# Patient Record
Sex: MALE | Race: WHITE | Employment: UNEMPLOYED | URBAN - METROPOLITAN AREA
[De-identification: names, ages, dates, MRNs, and addresses within clinical notes are randomized per-mention and may not be internally consistent; named-entity substitution may affect disease eponyms.]

---

## 2018-01-10 ENCOUNTER — HOSPITAL ENCOUNTER (OUTPATIENT)
Dept: SURGERY | Age: 58
Discharge: HOME | End: 2018-01-10
Payer: COMMERCIAL

## 2018-01-10 VITALS
RESPIRATION RATE: 16 BRPM | HEART RATE: 80 BPM | WEIGHT: 315 LBS | BODY MASS INDEX: 40.43 KG/M2 | TEMPERATURE: 98 F | HEIGHT: 74 IN | OXYGEN SATURATION: 96 %

## 2018-01-10 LAB
ATRIAL RATE: 72 BPM
BACTERIA SPEC CULT: NORMAL
CALCULATED P AXIS, ECG09: 40 DEGREES
CALCULATED R AXIS, ECG10: 19 DEGREES
CALCULATED T AXIS, ECG11: 51 DEGREES
DIAGNOSIS, 93000: NORMAL
P-R INTERVAL, ECG05: 136 MS
Q-T INTERVAL, ECG07: 402 MS
QRS DURATION, ECG06: 98 MS
QTC CALCULATION (BEZET), ECG08: 440 MS
SERVICE CMNT-IMP: NORMAL
VENTRICULAR RATE, ECG03: 72 BPM

## 2018-01-10 PROCEDURE — 93005 ELECTROCARDIOGRAM TRACING: CPT | Performed by: ANESTHESIOLOGY

## 2018-01-10 PROCEDURE — 87641 MR-STAPH DNA AMP PROBE: CPT | Performed by: ORTHOPAEDIC SURGERY

## 2018-01-10 RX ORDER — FLUOXETINE HYDROCHLORIDE 20 MG/1
20 CAPSULE ORAL DAILY
COMMUNITY

## 2018-01-10 RX ORDER — CYANOCOBALAMIN (VITAMIN B-12) 500 MCG
400 TABLET ORAL DAILY
COMMUNITY

## 2018-01-10 RX ORDER — HYDROCHLOROTHIAZIDE 25 MG/1
25 TABLET ORAL DAILY
COMMUNITY

## 2018-01-10 RX ORDER — LISINOPRIL 40 MG/1
40 TABLET ORAL DAILY
COMMUNITY

## 2018-01-10 RX ORDER — DICLOFENAC POTASSIUM 50 MG/1
50 TABLET, FILM COATED ORAL 3 TIMES DAILY
COMMUNITY

## 2018-01-10 RX ORDER — ROSUVASTATIN CALCIUM 10 MG/1
10 TABLET, COATED ORAL DAILY
COMMUNITY

## 2018-01-10 RX ORDER — AMLODIPINE BESYLATE 2.5 MG/1
2.5 TABLET ORAL DAILY
COMMUNITY
End: 2018-01-19 | Stop reason: DRUGHIGH

## 2018-01-10 RX ORDER — TRAMADOL HYDROCHLORIDE 50 MG/1
50 TABLET ORAL
COMMUNITY
End: 2019-12-28

## 2018-01-10 NOTE — PERIOP NOTES
Patient verified name, , and surgery as listed in [facility]. Type 3 surgery,  assessment complete. Labs per surgeon: not yet received--- labs drawn by Dr Raynette Kawasaki today- chart marked for charge nurse to obtain lab results  Labs per anesthesia protocol: Type and screen dos  EKG: today per anesthesia protocol--- approved by Dr Debbie Aquino and instructions given per hospital policy. Patient provided with and instructed on educational handouts including Guide to Surgery, Pain Management, Hand Hygiene, Blood Transfusion Education, and [place] [facility]esthesia Brochure. Patient answered medical/surgical history questions at their best of ability. All prior to admission medications documented in [facility]. Original medication prescription bottle  visualized during patient appointment. Patient instructed to hold all vitamins 7 days prior to surgery and NSAIDS 5 days prior to surgery, patient verbalized understanding. Medications to be held: diclofenac    Patient instructed to continue previous medications as prescribed prior to surgery and to take the following medications the day of surgery according to anesthesia guidelines with a small sip of water: amlodipine, tramadol, prozac and Crestor. Patient teach back successful and patient demonstrates knowledge of instructions.

## 2018-01-14 ENCOUNTER — ANESTHESIA EVENT (OUTPATIENT)
Dept: SURGERY | Age: 58
DRG: 470 | End: 2018-01-14
Payer: COMMERCIAL

## 2018-01-14 PROBLEM — M17.12 PRIMARY OSTEOARTHRITIS OF LEFT KNEE: Status: ACTIVE | Noted: 2018-01-14

## 2018-01-15 ENCOUNTER — ANESTHESIA (OUTPATIENT)
Dept: SURGERY | Age: 58
DRG: 470 | End: 2018-01-15
Payer: COMMERCIAL

## 2018-01-15 ENCOUNTER — HOSPITAL ENCOUNTER (INPATIENT)
Age: 58
LOS: 2 days | Discharge: HOME | DRG: 470 | End: 2018-01-17
Attending: ORTHOPAEDIC SURGERY | Admitting: ORTHOPAEDIC SURGERY
Payer: COMMERCIAL

## 2018-01-15 DIAGNOSIS — M17.12 PRIMARY OSTEOARTHRITIS OF LEFT KNEE: Primary | ICD-10-CM

## 2018-01-15 PROCEDURE — 65270000029 HC RM PRIVATE

## 2018-01-15 PROCEDURE — 74011250636 HC RX REV CODE- 250/636: Performed by: ANESTHESIOLOGY

## 2018-01-15 PROCEDURE — 77030013727 HC IRR FAN PULSVC ZIMM -B: Performed by: ORTHOPAEDIC SURGERY

## 2018-01-15 PROCEDURE — 77030008703 HC TU ET UNCUF COVD -A: Performed by: ANESTHESIOLOGY

## 2018-01-15 PROCEDURE — 77030011640 HC PAD GRND REM COVD -A: Performed by: ORTHOPAEDIC SURGERY

## 2018-01-15 PROCEDURE — 77010033678 HC OXYGEN DAILY

## 2018-01-15 PROCEDURE — 77030008477 HC STYL SATN SLP COVD -A: Performed by: ANESTHESIOLOGY

## 2018-01-15 PROCEDURE — 76210000000 HC OR PH I REC 2 TO 2.5 HR: Performed by: ORTHOPAEDIC SURGERY

## 2018-01-15 PROCEDURE — 74011250636 HC RX REV CODE- 250/636

## 2018-01-15 PROCEDURE — 74011000250 HC RX REV CODE- 250

## 2018-01-15 PROCEDURE — 74011250636 HC RX REV CODE- 250/636: Performed by: ORTHOPAEDIC SURGERY

## 2018-01-15 PROCEDURE — 77030020753 HC CUF TRNQT 1BLA STRY -B: Performed by: ORTHOPAEDIC SURGERY

## 2018-01-15 PROCEDURE — 77030003602 HC NDL NRV BLK BBMI -B: Performed by: ANESTHESIOLOGY

## 2018-01-15 PROCEDURE — 77030008467 HC STPLR SKN COVD -B: Performed by: ORTHOPAEDIC SURGERY

## 2018-01-15 PROCEDURE — 0SRD0J9 REPLACEMENT OF LEFT KNEE JOINT WITH SYNTHETIC SUBSTITUTE, CEMENTED, OPEN APPROACH: ICD-10-PCS | Performed by: ORTHOPAEDIC SURGERY

## 2018-01-15 PROCEDURE — 94760 N-INVAS EAR/PLS OXIMETRY 1: CPT

## 2018-01-15 PROCEDURE — 74011250637 HC RX REV CODE- 250/637: Performed by: ORTHOPAEDIC SURGERY

## 2018-01-15 PROCEDURE — 74011000258 HC RX REV CODE- 258: Performed by: ORTHOPAEDIC SURGERY

## 2018-01-15 PROCEDURE — 74011000250 HC RX REV CODE- 250: Performed by: ORTHOPAEDIC SURGERY

## 2018-01-15 PROCEDURE — 77030031139 HC SUT VCRL2 J&J -A: Performed by: ORTHOPAEDIC SURGERY

## 2018-01-15 PROCEDURE — C1776 JOINT DEVICE (IMPLANTABLE): HCPCS | Performed by: ORTHOPAEDIC SURGERY

## 2018-01-15 PROCEDURE — 77030003666 HC NDL SPINAL BD -A: Performed by: ORTHOPAEDIC SURGERY

## 2018-01-15 PROCEDURE — 77030018836 HC SOL IRR NACL ICUM -A: Performed by: ORTHOPAEDIC SURGERY

## 2018-01-15 PROCEDURE — 76060000035 HC ANESTHESIA 2 TO 2.5 HR: Performed by: ORTHOPAEDIC SURGERY

## 2018-01-15 PROCEDURE — C1713 ANCHOR/SCREW BN/BN,TIS/BN: HCPCS | Performed by: ORTHOPAEDIC SURGERY

## 2018-01-15 PROCEDURE — 76010000171 HC OR TIME 2 TO 2.5 HR INTENSV-TIER 1: Performed by: ORTHOPAEDIC SURGERY

## 2018-01-15 PROCEDURE — 76942 ECHO GUIDE FOR BIOPSY: CPT | Performed by: ORTHOPAEDIC SURGERY

## 2018-01-15 PROCEDURE — 77030033269 HC SLV COMPR SCD KNE2 CARD -B: Performed by: ORTHOPAEDIC SURGERY

## 2018-01-15 PROCEDURE — 77030020782 HC GWN BAIR PAWS FLX 3M -B: Performed by: ANESTHESIOLOGY

## 2018-01-15 PROCEDURE — 77030019908 HC STETH ESOPH SIMS -A: Performed by: ANESTHESIOLOGY

## 2018-01-15 PROCEDURE — 77030006835 HC BLD SAW SAG STRY -B: Performed by: ORTHOPAEDIC SURGERY

## 2018-01-15 PROCEDURE — 76010010054 HC POST OP PAIN BLOCK: Performed by: ORTHOPAEDIC SURGERY

## 2018-01-15 PROCEDURE — 77030020274 HC MISC IMPL ORTHOPEDIC: Performed by: ORTHOPAEDIC SURGERY

## 2018-01-15 PROCEDURE — 74011250637 HC RX REV CODE- 250/637: Performed by: ANESTHESIOLOGY

## 2018-01-15 DEVICE — (D)CEMENT BNE HV R 40GM -- DUPE USE ITEM 353850: Type: IMPLANTABLE DEVICE | Site: KNEE | Status: FUNCTIONAL

## 2018-01-15 DEVICE — GENESIS II NON-POROUS TIBIAL                                    BASEPLATE SIZE 7 LEFT
Type: IMPLANTABLE DEVICE | Site: KNEE | Status: FUNCTIONAL
Brand: GENESIS II

## 2018-01-15 RX ORDER — SODIUM CHLORIDE, SODIUM LACTATE, POTASSIUM CHLORIDE, CALCIUM CHLORIDE 600; 310; 30; 20 MG/100ML; MG/100ML; MG/100ML; MG/100ML
100 INJECTION, SOLUTION INTRAVENOUS CONTINUOUS
Status: DISCONTINUED | OUTPATIENT
Start: 2018-01-15 | End: 2018-01-15 | Stop reason: HOSPADM

## 2018-01-15 RX ORDER — METOPROLOL TARTRATE 25 MG/1
25 TABLET, FILM COATED ORAL DAILY
COMMUNITY

## 2018-01-15 RX ORDER — AMLODIPINE BESYLATE 5 MG/1
2.5 TABLET ORAL DAILY
Status: DISCONTINUED | OUTPATIENT
Start: 2018-01-16 | End: 2018-01-17 | Stop reason: HOSPADM

## 2018-01-15 RX ORDER — ASPIRIN 325 MG
325 TABLET, DELAYED RELEASE (ENTERIC COATED) ORAL EVERY 12 HOURS
Status: DISCONTINUED | OUTPATIENT
Start: 2018-01-15 | End: 2018-01-17 | Stop reason: HOSPADM

## 2018-01-15 RX ORDER — BUPIVACAINE HYDROCHLORIDE 2.5 MG/ML
INJECTION, SOLUTION EPIDURAL; INFILTRATION; INTRACAUDAL AS NEEDED
Status: DISCONTINUED | OUTPATIENT
Start: 2018-01-15 | End: 2018-01-15 | Stop reason: HOSPADM

## 2018-01-15 RX ORDER — FENTANYL CITRATE 50 UG/ML
INJECTION, SOLUTION INTRAMUSCULAR; INTRAVENOUS AS NEEDED
Status: DISCONTINUED | OUTPATIENT
Start: 2018-01-15 | End: 2018-01-15 | Stop reason: HOSPADM

## 2018-01-15 RX ORDER — HYDROCHLOROTHIAZIDE 25 MG/1
25 TABLET ORAL DAILY
Status: DISCONTINUED | OUTPATIENT
Start: 2018-01-16 | End: 2018-01-17 | Stop reason: HOSPADM

## 2018-01-15 RX ORDER — LIDOCAINE HYDROCHLORIDE 20 MG/ML
INJECTION, SOLUTION EPIDURAL; INFILTRATION; INTRACAUDAL; PERINEURAL AS NEEDED
Status: DISCONTINUED | OUTPATIENT
Start: 2018-01-15 | End: 2018-01-15 | Stop reason: HOSPADM

## 2018-01-15 RX ORDER — DIPHENHYDRAMINE HYDROCHLORIDE 50 MG/ML
12.5 INJECTION, SOLUTION INTRAMUSCULAR; INTRAVENOUS ONCE
Status: DISCONTINUED | OUTPATIENT
Start: 2018-01-15 | End: 2018-01-15 | Stop reason: HOSPADM

## 2018-01-15 RX ORDER — DEXAMETHASONE SODIUM PHOSPHATE 4 MG/ML
INJECTION, SOLUTION INTRA-ARTICULAR; INTRALESIONAL; INTRAMUSCULAR; INTRAVENOUS; SOFT TISSUE AS NEEDED
Status: DISCONTINUED | OUTPATIENT
Start: 2018-01-15 | End: 2018-01-15 | Stop reason: HOSPADM

## 2018-01-15 RX ORDER — FLUOXETINE HYDROCHLORIDE 20 MG/1
20 CAPSULE ORAL DAILY
Status: DISCONTINUED | OUTPATIENT
Start: 2018-01-16 | End: 2018-01-17 | Stop reason: HOSPADM

## 2018-01-15 RX ORDER — ENOXAPARIN SODIUM 100 MG/ML
40 INJECTION SUBCUTANEOUS EVERY 24 HOURS
Status: DISCONTINUED | OUTPATIENT
Start: 2018-01-16 | End: 2018-01-17 | Stop reason: HOSPADM

## 2018-01-15 RX ORDER — OXYCODONE AND ACETAMINOPHEN 5; 325 MG/1; MG/1
1 TABLET ORAL AS NEEDED
Status: DISCONTINUED | OUTPATIENT
Start: 2018-01-15 | End: 2018-01-15 | Stop reason: HOSPADM

## 2018-01-15 RX ORDER — METOPROLOL TARTRATE 25 MG/1
25 TABLET, FILM COATED ORAL DAILY
Status: DISCONTINUED | OUTPATIENT
Start: 2018-01-16 | End: 2018-01-17 | Stop reason: HOSPADM

## 2018-01-15 RX ORDER — ONDANSETRON 2 MG/ML
INJECTION INTRAMUSCULAR; INTRAVENOUS AS NEEDED
Status: DISCONTINUED | OUTPATIENT
Start: 2018-01-15 | End: 2018-01-15 | Stop reason: HOSPADM

## 2018-01-15 RX ORDER — SODIUM CHLORIDE 0.9 % (FLUSH) 0.9 %
5-10 SYRINGE (ML) INJECTION AS NEEDED
Status: DISCONTINUED | OUTPATIENT
Start: 2018-01-15 | End: 2018-01-17 | Stop reason: HOSPADM

## 2018-01-15 RX ORDER — SODIUM CHLORIDE 0.9 % (FLUSH) 0.9 %
5-10 SYRINGE (ML) INJECTION EVERY 8 HOURS
Status: DISCONTINUED | OUTPATIENT
Start: 2018-01-15 | End: 2018-01-17 | Stop reason: HOSPADM

## 2018-01-15 RX ORDER — CELECOXIB 200 MG/1
200 CAPSULE ORAL EVERY 12 HOURS
Status: DISCONTINUED | OUTPATIENT
Start: 2018-01-15 | End: 2018-01-17 | Stop reason: HOSPADM

## 2018-01-15 RX ORDER — PROPOFOL 10 MG/ML
INJECTION, EMULSION INTRAVENOUS AS NEEDED
Status: DISCONTINUED | OUTPATIENT
Start: 2018-01-15 | End: 2018-01-15 | Stop reason: HOSPADM

## 2018-01-15 RX ORDER — EPHEDRINE SULFATE 50 MG/ML
INJECTION, SOLUTION INTRAVENOUS AS NEEDED
Status: DISCONTINUED | OUTPATIENT
Start: 2018-01-15 | End: 2018-01-15 | Stop reason: HOSPADM

## 2018-01-15 RX ORDER — BUPIVACAINE HYDROCHLORIDE 5 MG/ML
INJECTION, SOLUTION EPIDURAL; INTRACAUDAL AS NEEDED
Status: DISCONTINUED | OUTPATIENT
Start: 2018-01-15 | End: 2018-01-15 | Stop reason: HOSPADM

## 2018-01-15 RX ORDER — SODIUM CHLORIDE 9 MG/ML
50 INJECTION, SOLUTION INTRAVENOUS CONTINUOUS
Status: DISCONTINUED | OUTPATIENT
Start: 2018-01-15 | End: 2018-01-15 | Stop reason: HOSPADM

## 2018-01-15 RX ORDER — SODIUM CHLORIDE 0.9 % (FLUSH) 0.9 %
5-10 SYRINGE (ML) INJECTION AS NEEDED
Status: DISCONTINUED | OUTPATIENT
Start: 2018-01-15 | End: 2018-01-15 | Stop reason: HOSPADM

## 2018-01-15 RX ORDER — SODIUM CHLORIDE 0.9 % (FLUSH) 0.9 %
5-10 SYRINGE (ML) INJECTION EVERY 8 HOURS
Status: DISCONTINUED | OUTPATIENT
Start: 2018-01-15 | End: 2018-01-15 | Stop reason: HOSPADM

## 2018-01-15 RX ORDER — FAMOTIDINE 20 MG/1
20 TABLET, FILM COATED ORAL ONCE
Status: DISCONTINUED | OUTPATIENT
Start: 2018-01-15 | End: 2018-01-15 | Stop reason: HOSPADM

## 2018-01-15 RX ORDER — FENTANYL CITRATE 50 UG/ML
25 INJECTION, SOLUTION INTRAMUSCULAR; INTRAVENOUS ONCE
Status: COMPLETED | OUTPATIENT
Start: 2018-01-15 | End: 2018-01-15

## 2018-01-15 RX ORDER — NALOXONE HYDROCHLORIDE 0.4 MG/ML
.2-.4 INJECTION, SOLUTION INTRAMUSCULAR; INTRAVENOUS; SUBCUTANEOUS
Status: DISCONTINUED | OUTPATIENT
Start: 2018-01-15 | End: 2018-01-17 | Stop reason: HOSPADM

## 2018-01-15 RX ORDER — AMOXICILLIN 250 MG
2 CAPSULE ORAL DAILY
Status: DISCONTINUED | OUTPATIENT
Start: 2018-01-16 | End: 2018-01-17 | Stop reason: HOSPADM

## 2018-01-15 RX ORDER — ROSUVASTATIN CALCIUM 20 MG/1
10 TABLET, COATED ORAL DAILY
Status: DISCONTINUED | OUTPATIENT
Start: 2018-01-16 | End: 2018-01-17 | Stop reason: HOSPADM

## 2018-01-15 RX ORDER — MIDAZOLAM HYDROCHLORIDE 1 MG/ML
2 INJECTION, SOLUTION INTRAMUSCULAR; INTRAVENOUS
Status: DISCONTINUED | OUTPATIENT
Start: 2018-01-15 | End: 2018-01-15 | Stop reason: HOSPADM

## 2018-01-15 RX ORDER — MIDAZOLAM HYDROCHLORIDE 1 MG/ML
2 INJECTION, SOLUTION INTRAMUSCULAR; INTRAVENOUS ONCE
Status: COMPLETED | OUTPATIENT
Start: 2018-01-15 | End: 2018-01-15

## 2018-01-15 RX ORDER — CEFAZOLIN SODIUM/WATER 2 G/20 ML
2 SYRINGE (ML) INTRAVENOUS ONCE
Status: DISCONTINUED | OUTPATIENT
Start: 2018-01-15 | End: 2018-01-15 | Stop reason: SDUPTHER

## 2018-01-15 RX ORDER — CEFAZOLIN SODIUM/WATER 2 G/20 ML
2 SYRINGE (ML) INTRAVENOUS EVERY 8 HOURS
Status: COMPLETED | OUTPATIENT
Start: 2018-01-15 | End: 2018-01-16

## 2018-01-15 RX ORDER — OXYCODONE HYDROCHLORIDE 5 MG/1
5 TABLET ORAL
Status: DISCONTINUED | OUTPATIENT
Start: 2018-01-15 | End: 2018-01-15 | Stop reason: HOSPADM

## 2018-01-15 RX ORDER — LISINOPRIL 20 MG/1
40 TABLET ORAL DAILY
Status: DISCONTINUED | OUTPATIENT
Start: 2018-01-16 | End: 2018-01-17 | Stop reason: HOSPADM

## 2018-01-15 RX ORDER — SODIUM CHLORIDE 9 MG/ML
100 INJECTION, SOLUTION INTRAVENOUS CONTINUOUS
Status: DISPENSED | OUTPATIENT
Start: 2018-01-15 | End: 2018-01-17

## 2018-01-15 RX ORDER — ROCURONIUM BROMIDE 10 MG/ML
INJECTION, SOLUTION INTRAVENOUS AS NEEDED
Status: DISCONTINUED | OUTPATIENT
Start: 2018-01-15 | End: 2018-01-15 | Stop reason: HOSPADM

## 2018-01-15 RX ORDER — HYDROCODONE BITARTRATE AND ACETAMINOPHEN 7.5; 325 MG/1; MG/1
1 TABLET ORAL
Status: DISCONTINUED | OUTPATIENT
Start: 2018-01-15 | End: 2018-01-17 | Stop reason: HOSPADM

## 2018-01-15 RX ORDER — LIDOCAINE HYDROCHLORIDE 10 MG/ML
0.1 INJECTION INFILTRATION; PERINEURAL AS NEEDED
Status: DISCONTINUED | OUTPATIENT
Start: 2018-01-15 | End: 2018-01-15 | Stop reason: HOSPADM

## 2018-01-15 RX ORDER — HYDROMORPHONE HYDROCHLORIDE 2 MG/ML
1 INJECTION, SOLUTION INTRAMUSCULAR; INTRAVENOUS; SUBCUTANEOUS
Status: DISCONTINUED | OUTPATIENT
Start: 2018-01-15 | End: 2018-01-17 | Stop reason: HOSPADM

## 2018-01-15 RX ORDER — ACETAMINOPHEN 10 MG/ML
1000 INJECTION, SOLUTION INTRAVENOUS ONCE
Status: COMPLETED | OUTPATIENT
Start: 2018-01-15 | End: 2018-01-15

## 2018-01-15 RX ORDER — DIPHENHYDRAMINE HCL 25 MG
25 CAPSULE ORAL
Status: DISCONTINUED | OUTPATIENT
Start: 2018-01-15 | End: 2018-01-17 | Stop reason: HOSPADM

## 2018-01-15 RX ORDER — SUCCINYLCHOLINE CHLORIDE 20 MG/ML
INJECTION INTRAMUSCULAR; INTRAVENOUS AS NEEDED
Status: DISCONTINUED | OUTPATIENT
Start: 2018-01-15 | End: 2018-01-15 | Stop reason: HOSPADM

## 2018-01-15 RX ORDER — HYDROMORPHONE HYDROCHLORIDE 2 MG/ML
0.5 INJECTION, SOLUTION INTRAMUSCULAR; INTRAVENOUS; SUBCUTANEOUS
Status: DISCONTINUED | OUTPATIENT
Start: 2018-01-15 | End: 2018-01-15 | Stop reason: HOSPADM

## 2018-01-15 RX ADMIN — HYDROMORPHONE HYDROCHLORIDE 0.5 MG: 2 INJECTION, SOLUTION INTRAMUSCULAR; INTRAVENOUS; SUBCUTANEOUS at 16:00

## 2018-01-15 RX ADMIN — ONDANSETRON 4 MG: 2 INJECTION INTRAMUSCULAR; INTRAVENOUS at 14:17

## 2018-01-15 RX ADMIN — HYDROMORPHONE HYDROCHLORIDE 1 MG: 2 INJECTION, SOLUTION INTRAMUSCULAR; INTRAVENOUS; SUBCUTANEOUS at 19:13

## 2018-01-15 RX ADMIN — HYDROMORPHONE HYDROCHLORIDE 0.5 MG: 2 INJECTION, SOLUTION INTRAMUSCULAR; INTRAVENOUS; SUBCUTANEOUS at 16:15

## 2018-01-15 RX ADMIN — ASPIRIN 325 MG: 325 TABLET, DELAYED RELEASE ORAL at 20:37

## 2018-01-15 RX ADMIN — SODIUM CHLORIDE, SODIUM LACTATE, POTASSIUM CHLORIDE, AND CALCIUM CHLORIDE: 600; 310; 30; 20 INJECTION, SOLUTION INTRAVENOUS at 14:26

## 2018-01-15 RX ADMIN — Medication 2 G: at 23:39

## 2018-01-15 RX ADMIN — CELECOXIB 200 MG: 200 CAPSULE ORAL at 20:47

## 2018-01-15 RX ADMIN — PROPOFOL 20 MG: 10 INJECTION, EMULSION INTRAVENOUS at 13:11

## 2018-01-15 RX ADMIN — FENTANYL CITRATE 25 MCG: 50 INJECTION INTRAMUSCULAR; INTRAVENOUS at 13:09

## 2018-01-15 RX ADMIN — EPHEDRINE SULFATE 10 MG: 50 INJECTION, SOLUTION INTRAVENOUS at 14:00

## 2018-01-15 RX ADMIN — MIDAZOLAM HYDROCHLORIDE 2 MG: 1 INJECTION, SOLUTION INTRAMUSCULAR; INTRAVENOUS at 13:09

## 2018-01-15 RX ADMIN — FENTANYL CITRATE 100 MCG: 50 INJECTION, SOLUTION INTRAMUSCULAR; INTRAVENOUS at 13:33

## 2018-01-15 RX ADMIN — ACETAMINOPHEN 1000 MG: 10 INJECTION, SOLUTION INTRAVENOUS at 17:00

## 2018-01-15 RX ADMIN — Medication 10 ML: at 20:39

## 2018-01-15 RX ADMIN — ROCURONIUM BROMIDE 10 MG: 10 INJECTION, SOLUTION INTRAVENOUS at 13:36

## 2018-01-15 RX ADMIN — EPHEDRINE SULFATE 10 MG: 50 INJECTION, SOLUTION INTRAVENOUS at 13:54

## 2018-01-15 RX ADMIN — BUPIVACAINE HYDROCHLORIDE 10 ML: 2.5 INJECTION, SOLUTION EPIDURAL; INFILTRATION; INTRACAUDAL at 13:11

## 2018-01-15 RX ADMIN — PROPOFOL 30 MG: 10 INJECTION, EMULSION INTRAVENOUS at 14:04

## 2018-01-15 RX ADMIN — BUPIVACAINE HYDROCHLORIDE 10 ML: 5 INJECTION, SOLUTION EPIDURAL; INTRACAUDAL at 13:11

## 2018-01-15 RX ADMIN — HYDROMORPHONE HYDROCHLORIDE 0.5 MG: 2 INJECTION, SOLUTION INTRAMUSCULAR; INTRAVENOUS; SUBCUTANEOUS at 16:10

## 2018-01-15 RX ADMIN — SODIUM CHLORIDE, SODIUM LACTATE, POTASSIUM CHLORIDE, AND CALCIUM CHLORIDE: 600; 310; 30; 20 INJECTION, SOLUTION INTRAVENOUS at 13:28

## 2018-01-15 RX ADMIN — FENTANYL CITRATE 50 MCG: 50 INJECTION, SOLUTION INTRAMUSCULAR; INTRAVENOUS at 14:58

## 2018-01-15 RX ADMIN — PROPOFOL 240 MG: 10 INJECTION, EMULSION INTRAVENOUS at 13:36

## 2018-01-15 RX ADMIN — OXYCODONE HYDROCHLORIDE 5 MG: 5 TABLET ORAL at 16:22

## 2018-01-15 RX ADMIN — SODIUM CHLORIDE 100 ML/HR: 900 INJECTION, SOLUTION INTRAVENOUS at 20:38

## 2018-01-15 RX ADMIN — SUCCINYLCHOLINE CHLORIDE 140 MG: 20 INJECTION INTRAMUSCULAR; INTRAVENOUS at 13:36

## 2018-01-15 RX ADMIN — FENTANYL CITRATE 50 MCG: 50 INJECTION, SOLUTION INTRAMUSCULAR; INTRAVENOUS at 14:25

## 2018-01-15 RX ADMIN — HYDROCODONE BITARTRATE AND ACETAMINOPHEN 1 TABLET: 7.5; 325 TABLET ORAL at 20:36

## 2018-01-15 RX ADMIN — DEXAMETHASONE SODIUM PHOSPHATE 4 MG: 4 INJECTION, SOLUTION INTRA-ARTICULAR; INTRALESIONAL; INTRAMUSCULAR; INTRAVENOUS; SOFT TISSUE at 14:15

## 2018-01-15 RX ADMIN — LIDOCAINE HYDROCHLORIDE 100 MG: 20 INJECTION, SOLUTION EPIDURAL; INFILTRATION; INTRACAUDAL; PERINEURAL at 13:36

## 2018-01-15 RX ADMIN — LIDOCAINE HYDROCHLORIDE 5 ML: 20 INJECTION, SOLUTION EPIDURAL; INFILTRATION; INTRACAUDAL; PERINEURAL at 13:11

## 2018-01-15 RX ADMIN — Medication 3 G: at 13:43

## 2018-01-15 RX ADMIN — PROPOFOL 30 MG: 10 INJECTION, EMULSION INTRAVENOUS at 13:10

## 2018-01-15 RX ADMIN — HYDROMORPHONE HYDROCHLORIDE 0.5 MG: 2 INJECTION, SOLUTION INTRAMUSCULAR; INTRAVENOUS; SUBCUTANEOUS at 16:05

## 2018-01-15 NOTE — H&P
Mac Martinez  History and physical     Subjective  Problem List.     1. Left knee pain. 2.  Left knee DJD    This patient presents today for evaluation of left pain. The patient comes in today for pre op evaluation, history and physical, and surgical consent signing. The surgical procedure was reviewed in detail with the patient. The risks, including but not limited to anesthesia, infection, deep vein thrombosis, pulmonary embolus, injury to vessels, tendons, and nerves, paralysis, stroke, heart attack, loss of limb, and death were discussed. The patient understands the post operative course and all questions were answered. No guarantees are made and all alternatives are given. The patient wishes to proceed with the surgery. Appropriate literature and relevant material was reviewed with the patient. Surgical procedure: left total knee arthroplasty. Family health history: breast cancer - mother; high cholesterol - mother; arthritis - mother. Major events: Total foot reconstruction (both). Ongoing medical problems: anxiety, high cholesterol, obesity, osteoarthritis, joint pain. Social history: Patient uses EtOH 4 to 6 times per week and denies use of tobacco.     He is a . REVIEW OF SYSTEMS:.     General: Denies weight change, generally healthy,  change in strength or exercise tolerance. Head: Denies headaches,  vertigo,  injury. .     Eyes: Denies changes in vision. Ears:  Denies change in hearing. Nose: Denies epistaxis,  obstruction,  discharge. .     Mouth: Denies dental difficulties. Neck: Denies pain or stiffness. Breast: Denies noted lumps. Chest: Denies cough or SOB. Heart: Denies chest pains,  palpitations. Abdomen: Denies change in appetite  constipation or diarrhea. : Denies urinary urgency,  dysuria, change in nature of urine. .     Musculoskeletal: left knee pain/DJD     Neurologic: Denies weakness, seizures or paralysis. Psychiatric: Denies depressive symptoms, changes in sleep habits,  changes in thought content. .     Cognitive: denies short/long term memory or word recollection problems. Objective  Vital signs: Height 74 inches; Weight 316.6 lbs; /74 mmHg; Temp 97.7 F; Pulse 69 bpm; Oxygen Saturation 99 %. Patient is a 62year old male who appears his given age and is in no apparent distress. Oriented to person, place, and time. Mood and affect are appropriate for age and situation. Assessment of respiratory effort reveals even and nonlabored respirations. GEN: NAD. Lungs clear to auscultation bilaterally. Heart rate regular without murmur heard to auscultation     He exhibits an antalgic, reciprocal gait while using a cane. Left knee x-rays (4 views, standing - CPT X6262767) taken 12/12/17 reveal: submedal subluxation of the femur on the tibia with a fragment noted medial to the medial tibial plateau. Severe bone on bone joint space narrowing in all 3 compartments. Left long leg x-ray (1 view - CPT 45966) taken today reveals : no malignment of the femur or lower leg. Left Knee examination:. Inspection reveals no external signs of acute injury or trauma. No palpable cords. No palpable cords. Palpation reveals diffuse tenderness over the left knee. Knee flexion is within functional limits, with pain. Knee extension to 0 degrees, with pain. The left knee is stable to stressing in all planes. Patella exam: normal tracking. Muscle tone is normal.     Neurologic: Sensation is intact and symmetrical in all dermatomes. Vascular: Peripheral pulses normal 2/2 lower extremities. Coordination is good. Assessment  left knee pain/DJD.  preop       DIAGNOSIS:        Pain in left knee [ICD-10: M25.562], [ICD-9: 719.46], [SNOMED: 663049708232221]        Primary osteoarthritis of left knee [ICD-10: M17.12], [ICD-9: 715.16], [SNOMED: 162764020]        Preop examination [ICD-10: I29.250], [ICD-9: V72.84], [SNOMED: 677227286]  Plan  We discussed the pathophysiology of the diagnosis and options for treatment. We discussed conservative treatment options including activity modification, monitoring of symptoms, focus on NWB exercises, weight reduction with dietary changes and participation in an exercise regimen, and occasional cortisone injections. We also discussed his surgical option (left TKA). We have talked about the complications of surgery, including the possibility of damage to nerves, arteries, vessels and tendons, bleeding, infection, the possibility of sustaining medical problems, even death. We have talked about the possibility that the condition may not improve after surgery  or that it could actually be worse. He seems to understand and accept these possible complications. He wishes to proceed with surgery at this time. Informed surgical consent obtained. Preop labs obtained. He will attend outpatient PT post op. All questions answered at this time. The patient knows to contact the office with any questions or concerns. .     VERIFICATION OF ANCILLARY DOCUMENTATION:  The portions of the chart completed by ancillary personnel were reviewed by the physician. .     RTC post op.        MEDICATIONS:        FLUoxetine HCl 20 MG Oral Tablet Take 1 tablet (20 mg) by mouth daily  (start date: 1/10/2018)                    prescription:   not prescribed during this visit        Norvasc 2.5 MG Oral Tablet Take 1 tablet (2.5 mg) by mouth daily  (start date: 1/10/2018)                    prescription:   not prescribed during this visit        Voltaren 1 % Transdermal Gel use as directed                    prescription:   not prescribed during this visit        Voltaren 50 MG Oral Tablet take 1 tab PO tid   (stop date: 1/10/2018)                    prescription:   discontinued during this visit        HydroCHLOROthiazide 25 MG Oral Tablet Take 1 tablet (25 mg) by mouth daily  (start date: 1/10/2018)                    prescription:   not prescribed during this visit        Lisinopril 40 MG Oral Tablet Take 1 tablet (40 mg) by mouth daily  (start date: 1/10/2018)                    prescription:   not prescribed during this visit        Rosuvastatin Calcium 10 MG Oral Tablet Take 1 tablet (10 mg) by mouth daily at bedtime  (start date: 1/10/2018)                    prescription:   not prescribed during this visit        TraMADol HCl 50 MG Oral Tablet Take 1 tablet (50 mg) by mouth every 6 hours as needed  (start date: 12/28/2017)                    prescription:   not prescribed during this visit

## 2018-01-15 NOTE — ANESTHESIA PREPROCEDURE EVALUATION
Anesthetic History   No history of anesthetic complications            Review of Systems / Medical History  Patient summary reviewed and pertinent labs reviewed    Pulmonary  Within defined limits                 Neuro/Psych   Within defined limits           Cardiovascular    Hypertension: well controlled          CAD  Pertinent negatives: No cardiac stents  Exercise tolerance: >4 METS     GI/Hepatic/Renal  Within defined limits              Endo/Other        Morbid obesity and arthritis     Other Findings              Physical Exam    Airway  Mallampati: II  TM Distance: 4 - 6 cm  Neck ROM: normal range of motion   Mouth opening: Normal     Cardiovascular    Rhythm: regular  Rate: normal         Dental  No notable dental hx       Pulmonary  Breath sounds clear to auscultation               Abdominal  GI exam deferred       Other Findings            Anesthetic Plan    ASA: 3  Patient did not consent to regional anesthesiaAnesthesia type: general      Post-op pain plan if not by surgeon: peripheral nerve block single    Induction: Intravenous  Anesthetic plan and risks discussed with: Patient      Refuses spinal

## 2018-01-15 NOTE — ANESTHESIA PROCEDURE NOTES
Peripheral Block    Start time: 1/15/2018 1:10 PM  End time: 1/15/2018 1:11 PM  Performed by: Foster Nieves  Authorized by: Foster Nieves       Pre-procedure: Indications: at surgeon's request and post-op pain management    Preanesthetic Checklist: patient identified, risks and benefits discussed, site marked, timeout performed, anesthesia consent given and patient being monitored    Timeout Time: 13:10          Block Type:   Block Type:   Adductor canal  Laterality:  Left  Monitoring:  Standard ASA monitoring, continuous pulse ox, frequent vital sign checks, heart rate and oxygen  Injection Technique:  Single shot  Procedures: ultrasound guided and nerve stimulator    Prep: chlorhexidine    Location:  Mid thigh  Needle Type:  Stimuplex  Needle Gauge:  22 G  Needle Localization:  Nerve stimulator and ultrasound guidance  Motor Response: minimal motor response >0.4 mA    Medication Injected:  0.375%  bupivacaine  Volume (mL):  20  Add'l Medication Injected:  2.0%  lidocaine  Volume (mL):  5    Assessment:  Number of attempts:  1  Injection Assessment:  Local visualized surrounding nerve on ultrasound, negative aspiration for CSF, negative aspiration for blood, no paresthesia, no intravascular symptoms, ultrasound image on chart and incremental injection every 5 mL  Patient tolerance:  Patient tolerated the procedure well with no immediate complications

## 2018-01-15 NOTE — IP AVS SNAPSHOT
[facility] 
 
 
 [address] 
[phone] Patient: Modesta Jackson MRN: [id number] YHN:[id number] About your hospitalization You were admitted on:  January 15, 2018 You last received care in the:  [facility] 7 MED SURG You were discharged on:  January 17, 2018 Why you were hospitalized Your primary diagnosis was:  Primary Osteoarthritis Of Left Knee Your diagnoses also included:  Left Knee Djd Follow-up Information Follow up With Details Comments Contact Info Lissett Crow MD   [place] [id number] 
[phone] Parish Willard MD  Please call the office when they open to confirm follow-up appointment [address] 
[phone] Discharge Orders None A check mariela indicates which time of day the medication should be taken. My Medications START taking these medications Instructions Each Dose to Equal  
 Morning Noon Evening Bedtime HYDROcodone-acetaminophen  mg tablet Commonly known as:  Karie Morrissey Your next dose is: Take on as needed schedule Take 1 Tab by mouth every six (6) hours as needed for Pain. Max Daily Amount: 4 Tabs. 1 Tab CONTINUE taking these medications Instructions Each Dose to Equal  
 Morning Noon Evening Bedtime  
 amLODIPine 2.5 mg tablet Commonly known as:  Juanjo Horn Your next dose is:  Tomorrow Morning Take 2.5 mg by mouth daily. Indications: morning 2.5 mg  
    
  
   
   
   
  
 cholecalciferol 400 unit Tab tablet Commonly known as:  VITAMIN D3 Your next dose is:  Tomorrow Morning Take 400 Units by mouth daily. 400 Units  
    
  
   
   
   
  
 diclofenac potassium 50 mg tablet Commonly known as:  CATAFLAM  
Your next dose is:  Resume home schedule Take 50 mg by mouth three (3) times daily. Indications: hold for surgery- instructed 1/10/18  
 50 mg  
    
   
   
   
  
 hydroCHLOROthiazide 25 mg tablet Commonly known as:  HYDRODIURIL Your next dose is:  Tomorrow Morning Take 25 mg by mouth daily. 25 mg  
    
  
   
   
   
  
 lisinopril 40 mg tablet Commonly known as:  Alonso Chino Your next dose is:  Tomorrow Morning Take 40 mg by mouth daily. Indications: morning 40 mg  
    
  
   
   
   
  
 metoprolol tartrate 25 mg tablet Commonly known as:  LOPRESSOR Your next dose is:  Tomorrow Morning Take 25 mg by mouth daily. 25 mg PROzac 20 mg capsule Generic drug:  FLUoxetine Your next dose is:  Tomorrow Morning Take 20 mg by mouth daily. 20 mg  
    
  
   
   
   
  
 rosuvastatin 10 mg tablet Commonly known as:  CRESTOR Your next dose is:  Tomorrow Morning Take 10 mg by mouth daily. Indications: morning 10 mg  
    
  
   
   
   
  
 traMADol 50 mg tablet Commonly known as:  ULTRAM  
Your next dose is: Take on as needed schedule Take 50 mg by mouth every six (6) hours as needed for Pain. 50 mg Where to Get Your Medications Information on where to get these meds will be given to you by the nurse or doctor. ! Ask your nurse or doctor about these medications HYDROcodone-acetaminophen  mg tablet Discharge Instructions DO NOT remove the dressing on your knee until [address] visits MAY shower ACTIVITY as tolerated NO driving until cleared by Dr. Peter Duarte CALL Dr. Peter Duarte if [phone]):  Fever >100.5 Incision becomes red,  swollen or opens up Incision has yellow, thick drainage or an odor Pain is not managed with prescribed medications Excessive nausea and/or vomiting Report to ***  redness ,swelling or pain  in your calf, back of knee, thigh or groin Go to emergency room for sudden chest pain and difficulty breathing Avoid having pets sleep in bed with you until incision is completely healed DISCHARGE SUMMARY from Nurse PATIENT INSTRUCTIONS: 
 
 
F-face looks uneven A-arms unable to move or move unevenly S-speech slurred or non-existent T-time-call 911 as soon as signs and symptoms begin-DO NOT go Back to bed or wait to see if you get better-TIME IS BRAIN. Warning Signs of HEART ATTACK Call 911 if you have these symptoms: 
? Chest discomfort. Most heart attacks involve discomfort in the center of the chest that lasts more than a few minutes, or that goes away and comes back. It can feel like uncomfortable pressure, squeezing, fullness, or pain. ? Discomfort in other areas of the upper body. Symptoms can include pain or discomfort in one or both arms, the back, neck, jaw, or stomach. ? Shortness of breath with or without chest discomfort. ? Other signs may include breaking out in a cold sweat, nausea, or lightheadedness. Don't wait more than five minutes to call [address]! Fast action can save your life. Calling 911 is almost always the fastest way to get lifesaving treatment. Emergency Medical Services staff can begin treatment when they arrive  up to an hour sooner than if someone gets to the hospital by car. The discharge information has been reviewed with the patient. The patient verbalized understanding. Discharge medications reviewed with the patient and appropriate educational materials and side effects teaching were provided. ___________________________________________________________________________________________________________________________________ [employer] Announcement We are excited to announce that we are making your provider's discharge notes available to you in [employer]. You will see these notes when they are completed and signed by the physician that discharged you from your recent hospital stay. If you have any questions or concerns about any information you see in [employer], please call the Health Information Department where you were seen or reach out to your Primary Care Provider for more information about your plan of care. Introducing [facility] SERVICES! Donnie Caraballo introduces [employer] patient portal. Now you can access parts of your medical record, email your doctor's office, and request medication refills online. 1. In your internet browser, go to [email or web address] 2. Click on the First Time User? Click Here link in the Sign In box. You will see the New Member Sign Up page. 3. Enter your [employer] Access Code exactly as it appears below. You will not need to use this code after youve completed the sign-up process. If you do not sign up before the expiration date, you must request a new code. · [employer] Access Code: [email or web address] Expires: 4/9/2018 10:01 AM 
 
4. Enter the last four digits of your Social Security Number (xxxx) and Date of Birth (mm/dd/yyyy) as indicated and click Submit. You will be taken to the next sign-up page. 5. Create a [employer] ID. This will be your [employer] login ID and cannot be changed, so think of one that is secure and easy to remember. 6. Create a [employer] password. You can change your password at any time. 7. Enter your Password Reset Question and Answer. This can be used at a later time if you forget your password. 8. Enter your e-mail address. You will receive e-mail notification when new information is available in [address] 9. Click Sign Up. You can now view and download portions of your medical record.  
10. Click the Download Summary menu link to download a portable copy of your medical information. If you have questions, please visit the Frequently Asked Questions section of the [email or web address] website. Remember, [employer] is NOT to be used for urgent needs. For medical emergencies, dial 911. Now available from your iPhone and Android! Providers Seen During Your Hospitalization Provider Specialty Primary office phone Luciano Walters MD Orthopedic Surgery [phone] Immunizations Administered for This Admission Name Date  
 TB Skin Test (PPD) Intradermal 1/16/2018 Your Primary Care Physician (PCP) Primary Care Physician Office Phone Office Fax Demarcus REDMOND [address] You are allergic to the following No active allergies Recent Documentation Smoking Status Never Smoker Emergency Contacts Name Discharge Info Relation Home Work Mobile [facility]  Brother [02] [phone] Patient Belongings The following personal items are in your possession at time of discharge: 
  Dental Appliances: None  Visual Aid: Glasses      Home Medications: None   Jewelry: None  Clothing: Footwear, Jacket/Coat, Pants, Shirt, Socks, Undergarments    Other Valuables: Eyeglasses Please provide this summary of care documentation to your next provider. Signatures-by signing, you are acknowledging that this After Visit Summary has been reviewed with you and you have received a copy. Patient Signature:  ____________________________________________________________ Date:  ____________________________________________________________  
  
Ciaran Gracia Provider Signature:  ____________________________________________________________ Date:  ____________________________________________________________

## 2018-01-15 NOTE — OP NOTES
Operative Report    Patient: Allan Wellington MRN: [id number]  SSN: [id number]    YOB: 1960  Age: 62 y.o. Sex: male      Date of Surgery: 1/15/2018     Patient is a 62 y.o. male with a history of degenerative arthritis of the left knee, refractory to conservative treatment. Patient desires surgical treatment in the form of left total knee arthroplasty. The risks and complications were thoroughly discussed and informed consent was obtained. The patient understands these risks to include but not be limited to infection, wearing out, loosening, infection necessitating multiple procedures to get this resolved, which could even result in amputation, the possibility of blood clots, death, and the other less common but serious complications may occur. Informed consent was obtained. Preoperative Diagnosis: Osteoarthritis of left knee, unspecified osteoarthritis type [M17.12]     Postoperative Diagnosis: Osteoarthritis of left knee, unspecified osteoarthritis type [M17.12]     Surgeon(s) and Role:     * Juli Zhu MD - Primary    Anesthesia: General     Procedure: Procedure(s) (LRB):  KNEE ARTHROPLASTY TOTAL/ LEFT/ CAMARA AND NEPHEW (Left) Procedure(s):  KNEE ARTHROPLASTY TOTAL/ LEFT/ CAMARA AND NEPHEW     Procedure in Detail:   The patient was taken to the Operating Room where general anesthesia was introduced. This provided satisfactory anesthesia during the procedure. Tourniquet was used on the upper left thigh over Webril padding and the left lower extremity was prepped and draped into a sterile field. A time-out was done to confirm the operative site, surgeon, and procedure. Upon verification by the surgical team, the procedure was begun. The extremity was exsanguinated by inflating the tourniquet to 275 mmHg. Standard median parapatellar incision was made, dissection carried down through the straight midline incision to the medial side of the extensor mechanism and arthrotomy was created.  A large effusion was evacuated. The knee was then flexed, patella everted laterally. Several small loose bodies were evacuated from the knee. Significant degenerative changes were seen tricompartmentally. There was a very thick pseudo membrane encasing the entire knee joint which was carefully dissected. This was markedly thick and tough. The prepatellar fat pad was removed as well as anterior portions of the medial and lateral menisci with sharp dissection. Rongeur was utilized to remove the osteophyte circumference around the patella which was down significantly to bone, and was noted to be DJD. The saw set from the  instrumentation was used to make the undersurface osteotomy, drilling out three holes for the three pegs, a trial component was placed and trialed. The intramedullary guide was then used to perform the distal femoral resection and sizing. The chamfer cuts were made. The extramedullary guide was used for the tibia, the tibial cut was made, balancing the knee carefully. A large tibial cut was made because of a large PM defect with a large bone loose body which was removed. After the tibial cut this defect was 10 mm in length and about 5 mm depth, but involved less than 10 % of the tibial surface. I felt that a tibial stem was warranted because of the patient's size and this defect, so a 100 mm IM justin was added to the tibial component. The ACL and PCL were removed. Then the trial components were placed , tibial component, repair of tibial articular surface and then drilled both the femur and the tibia. Three liters of Pulsavac lavage solution were then passed through the knee while 2 batches of Palacos cement was mixed on the back table. Once this was done, the knee was instrumented beginning with the tibia, the femur, and the patella in that order. The knee was held in full extension with a 18 mm trial liner.  Once the cement had hardened and all extraneous bone cement was removed, the tourniquet was released to achieve hemostasis. Permanent 18 mm liner was locked in place and the knee had full extension, full flexion, good stability to varus and valgus stressing along the patellofemoral tract. The knee was then irrigated again and a gram of TXA placed IA. The knee was then closed in a layered fashion with #1 Vicryl in the extensor mechanism, 0 Vicryl deeply, 2-0 Vicryl and surgical clips in the skin. The patient tolerated the procedure well without any complications. Estimated Blood Loss:  150 ml    Tourniquet Time:   Total Tourniquet Time Documented:  Thigh (Left) - 67 minutes  Total: Thigh (Left) - 67 minutes        Implants:   Implant Name Type Inv.  Item Serial No.  Lot No. LRB No. Used Action   CEMENT BNE HV R 40GM -- PALACOS - [id number]  CEMENT BNE HV R 40GM -- PALACOS  [employer] [id number] Left 1 Implanted   CEMENT BNE HV R 40GM -- PALACOS - [id number]  CEMENT BNE HV R 40GM -- PALACOS  [employer] [id number] Left 1 Implanted   COMP TIBIAL CEMENTED GII SZ 7 LEFT - [id number] Joint Component COMP TIBIAL CEMENTED GII SZ 7 LEFT  SMITH \T\ NEPHEW WOUND CARE [id number] Left 1 Implanted   SIZE 7 CRUCIATE RETAINING FEMORAL COMPONENT    [employer] NEPHE[employer] [id number] Left 1 Implanted   10MM X 100MM NON SLOTTED STEM     [employer] [id number] Left 1 Implanted   41MM PATELLAR COMPONENT    CAMARA [employer]PHE[employer] [id number] Left 1 Implanted   SIZE 7-8  18MM ARTICULAR SURFACE         [id number] Left 1 Implanted               Specimens: * No specimens in log *        Signed By:  Ileana Naylor MD     January 15, 2018

## 2018-01-15 NOTE — PERIOP NOTES
TRANSFER - OUT REPORT:    Verbal report given to Kristal TAN(name) on [employer]  being transferred to 725(unit) for routine post - op       Report consisted of patients Situation, Background, Assessment and   Recommendations(SBAR). Information from the following report(s) SBAR, OR Summary, Procedure Summary, Intake/Output and MAR was reviewed with the receiving nurse. Lines:   Peripheral IV 01/15/18 Right Forearm (Active)   Site Assessment Clean, dry, & intact 1/15/2018  4:37 PM   Phlebitis Assessment 0 1/15/2018  4:37 PM   Infiltration Assessment 0 1/15/2018  4:37 PM   Dressing Status Clean, dry, & intact 1/15/2018  4:37 PM   Dressing Type Transparent 1/15/2018  4:37 PM   Hub Color/Line Status Green; Infusing 1/15/2018  4:37 PM   Alcohol Cap Used No 1/15/2018  4:37 PM        Opportunity for questions and clarification was provided. Patient transported with:   O2 @ 2 liters    VTE prophylaxis orders have been written for [employer]. Patient and family given floor number and nurses name. Family updated re: pt status after security code verified.

## 2018-01-15 NOTE — ANESTHESIA POSTPROCEDURE EVALUATION
Post-Anesthesia Evaluation and Assessment    Patient: Trav Mccarty MRN: [id number]  SSN: [id number]    YOB: 1960  Age: 62 y.o. Sex: male       Cardiovascular Function/Vital Signs  Visit Vitals    BP (!) 171/93 (BP 1 Location: Left arm, BP Patient Position: At rest)    Pulse 92    Temp 36.7 °C (98 °F)    Resp 16    SpO2 96%       Patient is status post general anesthesia for Procedure(s):  KNEE ARTHROPLASTY TOTAL/ LEFT/ SMITH AND NEPHEW. Nausea/Vomiting: None    Postoperative hydration reviewed and adequate. Pain:  Pain Scale 1: Numeric (0 - 10) (01/15/18 1637)  Pain Intensity 1: 9 (01/15/18 1637)   Managed    Neurological Status:   Neuro (WDL): Exceptions to WDL (01/15/18 1637)  Neuro  LUE Motor Response: Purposeful (01/15/18 1637)  LLE Motor Response: Pharmacologically paralyzed (01/15/18 1637)  RUE Motor Response: Purposeful (01/15/18 1637)  RLE Motor Response: Purposeful (01/15/18 1637)   Left adductor canal block otherwise normal    Mental Status and Level of Consciousness: Alert and oriented     Pulmonary Status:   O2 Device: Nasal cannula (01/15/18 1637)   Adequate oxygenation and airway patent    Complications related to anesthesia: None    Post-anesthesia assessment completed.  No concerns    Signed By: Mauricio Ortega MD     January 15, 2018

## 2018-01-15 NOTE — H&P
History and Physical Updated with no interval change. Shaina Sigala MD History and Physical Updated with no interval change.  Shaina Sigala MD

## 2018-01-16 LAB
CREAT SERPL-MCNC: 0.77 MG/DL (ref 0.8–1.5)
HGB BLD-MCNC: 10.7 G/DL (ref 13.6–17.2)
PLATELET # BLD AUTO: 289 K/UL (ref 150–450)

## 2018-01-16 PROCEDURE — 97165 OT EVAL LOW COMPLEX 30 MIN: CPT

## 2018-01-16 PROCEDURE — 85049 AUTOMATED PLATELET COUNT: CPT | Performed by: ORTHOPAEDIC SURGERY

## 2018-01-16 PROCEDURE — 97535 SELF CARE MNGMENT TRAINING: CPT

## 2018-01-16 PROCEDURE — 74011250637 HC RX REV CODE- 250/637: Performed by: ORTHOPAEDIC SURGERY

## 2018-01-16 PROCEDURE — 86580 TB INTRADERMAL TEST: CPT | Performed by: ORTHOPAEDIC SURGERY

## 2018-01-16 PROCEDURE — 65270000029 HC RM PRIVATE

## 2018-01-16 PROCEDURE — 97116 GAIT TRAINING THERAPY: CPT

## 2018-01-16 PROCEDURE — 82565 ASSAY OF CREATININE: CPT | Performed by: ORTHOPAEDIC SURGERY

## 2018-01-16 PROCEDURE — 97110 THERAPEUTIC EXERCISES: CPT

## 2018-01-16 PROCEDURE — 36415 COLL VENOUS BLD VENIPUNCTURE: CPT | Performed by: ORTHOPAEDIC SURGERY

## 2018-01-16 PROCEDURE — 74011000302 HC RX REV CODE- 302: Performed by: ORTHOPAEDIC SURGERY

## 2018-01-16 PROCEDURE — 97161 PT EVAL LOW COMPLEX 20 MIN: CPT

## 2018-01-16 PROCEDURE — 74011250636 HC RX REV CODE- 250/636: Performed by: ORTHOPAEDIC SURGERY

## 2018-01-16 PROCEDURE — 85018 HEMOGLOBIN: CPT | Performed by: ORTHOPAEDIC SURGERY

## 2018-01-16 RX ADMIN — SODIUM CHLORIDE 100 ML/HR: 900 INJECTION, SOLUTION INTRAVENOUS at 05:47

## 2018-01-16 RX ADMIN — CELECOXIB 200 MG: 200 CAPSULE ORAL at 05:38

## 2018-01-16 RX ADMIN — FLUOXETINE 20 MG: 20 CAPSULE ORAL at 08:50

## 2018-01-16 RX ADMIN — Medication 10 ML: at 21:42

## 2018-01-16 RX ADMIN — HYDROMORPHONE HYDROCHLORIDE 1 MG: 2 INJECTION, SOLUTION INTRAMUSCULAR; INTRAVENOUS; SUBCUTANEOUS at 16:10

## 2018-01-16 RX ADMIN — AMLODIPINE BESYLATE 2.5 MG: 5 TABLET ORAL at 08:49

## 2018-01-16 RX ADMIN — HYDROMORPHONE HYDROCHLORIDE 1 MG: 2 INJECTION, SOLUTION INTRAMUSCULAR; INTRAVENOUS; SUBCUTANEOUS at 08:46

## 2018-01-16 RX ADMIN — ASPIRIN 325 MG: 325 TABLET, DELAYED RELEASE ORAL at 08:48

## 2018-01-16 RX ADMIN — HYDROCODONE BITARTRATE AND ACETAMINOPHEN 1 TABLET: 7.5; 325 TABLET ORAL at 05:46

## 2018-01-16 RX ADMIN — CELECOXIB 200 MG: 200 CAPSULE ORAL at 17:45

## 2018-01-16 RX ADMIN — HYDROCODONE BITARTRATE AND ACETAMINOPHEN 1 TABLET: 7.5; 325 TABLET ORAL at 21:41

## 2018-01-16 RX ADMIN — LISINOPRIL 40 MG: 20 TABLET ORAL at 08:49

## 2018-01-16 RX ADMIN — HYDROCODONE BITARTRATE AND ACETAMINOPHEN 1 TABLET: 7.5; 325 TABLET ORAL at 00:04

## 2018-01-16 RX ADMIN — ASPIRIN 325 MG: 325 TABLET, DELAYED RELEASE ORAL at 21:41

## 2018-01-16 RX ADMIN — HYDROMORPHONE HYDROCHLORIDE 1 MG: 2 INJECTION, SOLUTION INTRAMUSCULAR; INTRAVENOUS; SUBCUTANEOUS at 04:24

## 2018-01-16 RX ADMIN — HYDROCODONE BITARTRATE AND ACETAMINOPHEN 1 TABLET: 7.5; 325 TABLET ORAL at 11:02

## 2018-01-16 RX ADMIN — HYDROCODONE BITARTRATE AND ACETAMINOPHEN 1 TABLET: 7.5; 325 TABLET ORAL at 17:46

## 2018-01-16 RX ADMIN — HYDROCHLOROTHIAZIDE 25 MG: 25 TABLET ORAL at 08:48

## 2018-01-16 RX ADMIN — STANDARDIZED SENNA CONCENTRATE AND DOCUSATE SODIUM 2 TABLET: 8.6; 5 TABLET, FILM COATED ORAL at 08:49

## 2018-01-16 RX ADMIN — METOPROLOL TARTRATE 25 MG: 25 TABLET ORAL at 08:49

## 2018-01-16 RX ADMIN — Medication 2 G: at 05:42

## 2018-01-16 RX ADMIN — ENOXAPARIN SODIUM 40 MG: 40 INJECTION SUBCUTANEOUS at 05:40

## 2018-01-16 RX ADMIN — ROSUVASTATIN CALCIUM 10 MG: 20 TABLET, FILM COATED ORAL at 08:49

## 2018-01-16 RX ADMIN — TUBERCULIN PURIFIED PROTEIN DERIVATIVE 5 UNITS: 5 INJECTION INTRADERMAL at 11:04

## 2018-01-16 NOTE — PROGRESS NOTES
Orthopedic Joint Progress Note    2018  Admit Date: 1/15/2018  Admit Diagnosis: Osteoarthritis of left knee, unspecified osteoarthritis type [M17.12]    1 Day Post-Op    Subjective:     Connor Martinez alert, oriented with moderate but controlled pain. Participating with PT. Review of Systems: Pertinent items are noted in HPI. Objective:     PT/OT:     PATIENT MOBILITY    Bed Mobility  Supine to Sit: Contact guard assistance  Scooting: Stand-by asssistance  Transfers  Sit to Stand: Contact guard assistance  Stand to Sit: Contact guard assistance  Bed to Chair: Contact guard assistance      Gait  Base of Support: Shift to right  Speed/Janet: Slow, Shuffled  Step Length: Right shortened  Swing Pattern: Left asymmetrical  Stance: Right increased, Left decreased  Gait Abnormalities: Decreased step clearance, Shuffling gait, Step to gait  Ambulation - Level of Assistance: Contact guard assistance  Distance (ft): 15 Feet (ft)  Assistive Device: Walker, rolling  Interventions: Safety awareness training, Tactile cues, Verbal cues            Vital Signs:    Blood pressure 90/52, pulse 71, temperature 98.2 °F (36.8 °C), resp. rate 19, SpO2 96 %.   Temp (24hrs), Av.2 °F (36.8 °C), Min:97.7 °F (36.5 °C), Max:98.4 °F (36.9 °C)      Pain Control:   Pain Assessment  Pain Scale 1: Numeric (0 - 10)  Pain Intensity 1: 7  Pain Onset 1: post-op  Pain Location 1: Knee, Leg  Pain Orientation 1: Left  Pain Description 1: Aching, Constant  Pain Intervention(s) 1: Medication (see MAR)    Meds:  Current Facility-Administered Medications   Medication Dose Route Frequency    amLODIPine (NORVASC) tablet 2.5 mg  2.5 mg Oral DAILY    FLUoxetine (PROzac) capsule 20 mg  20 mg Oral DAILY    hydroCHLOROthiazide (HYDRODIURIL) tablet 25 mg  25 mg Oral DAILY    lisinopril (PRINIVIL, ZESTRIL) tablet 40 mg  40 mg Oral DAILY    metoprolol tartrate (LOPRESSOR) tablet 25 mg  25 mg Oral DAILY    rosuvastatin (CRESTOR) tablet 10 mg  10 mg Oral DAILY    0.9% sodium chloride infusion  100 mL/hr IntraVENous CONTINUOUS    sodium chloride (NS) flush 5-10 mL  5-10 mL IntraVENous Q8H    sodium chloride (NS) flush 5-10 mL  5-10 mL IntraVENous PRN    celecoxib (CELEBREX) capsule 200 mg  200 mg Oral Q12H    naloxone (NARCAN) injection 0.2-0.4 mg  0.2-0.4 mg IntraVENous Q10MIN PRN    diphenhydrAMINE (BENADRYL) capsule 25 mg  25 mg Oral Q4H PRN    aspirin delayed-release tablet 325 mg  325 mg Oral Q12H    tuberculin injection 5 Units  5 Units IntraDERMal ONCE    HYDROcodone-acetaminophen (NORCO) 7.5-325 mg per tablet 1 Tab  1 Tab Oral Q4H PRN    HYDROmorphone (PF) (DILAUDID) injection 1 mg  1 mg IntraVENous Q3H PRN    senna-docusate (PERICOLACE) 8.6-50 mg per tablet 2 Tab  2 Tab Oral DAILY    enoxaparin (LOVENOX) injection 40 mg  40 mg SubCUTAneous Q24H        LAB:    No results found for: INR  Lab Results   Component Value Date/Time    HGB 10.7 01/16/2018 09:02 AM       Wound Knee Left (Active)   DRESSING STATUS Clean, dry, and intact 1/16/2018  6:31 AM   DRESSING TYPE Elastic bandage 1/16/2018  6:31 AM   Number of days:1             Physical Exam:  No significant changes    Assessment:      Principal Problem:    Primary osteoarthritis of left knee (1/14/2018)    Active Problems:    Left knee DJD (1/15/2018)         Plan:probable discharge tomorrow.      Continue PT/OT/Rehab  Consult: Rehab team including PT, OT, recreational therapy, and     Patient Expects to be Discharged to[de-identified] Private residence     Signed By: Vinicio Ashton MD

## 2018-01-16 NOTE — PROGRESS NOTES
Problem: Self Care Deficits Care Plan (Adult)  Goal: *Acute Goals and Plan of Care (Insert Text)  1. Patient will complete lower body bathing and dressing with setup and adaptive equipment as needed. 2. Patient will complete functional transfers with modified independence and adaptive equipment as needed. 3. Patient will complete toileting with modified independence. 4. Patient will tolerate at least 20 minutes of BUE therapeutic exercises to strengthen BUE to aid in functional transfers. 5. Patient will complete functional mobility of household distances with modified independence  and adaptive equipment as needed. Timeframe: 7 visits       OCCUPATIONAL THERAPY: Initial Assessment, Daily Note and Treatment Day: 1st 1/16/2018  INPATIENT: Hospital Day: 2  Payor: BLUE CHOICE / [place] CHO[place] / Product Type: HMO /      NAME/AGE/GENDER: Darren Martinez is a 62 y.o. male   PRIMARY DIAGNOSIS:  Osteoarthritis of left knee, unspecified osteoarthritis type [M17.12] Primary osteoarthritis of left knee Primary osteoarthritis of left knee  Procedure(s) (LRB):  KNEE ARTHROPLASTY TOTAL/ LEFT/ CAMARA AND NEPHEW (Left)  1 Day Post-Op  ICD-10: Treatment Diagnosis:    · Pain in Left Knee (M25.562)  · Stiffness of Left Knee, Not elsewhere classified (A19.165)   Precautions/Allergies:    WBAT LLE    Review of patient's allergies indicates no known allergies. ASSESSMENT:     Mr. Makayla Doll is a 62year old male who is now s/p L TKA and WBAT in LLE. At baseline patient lives alone and is typically independent with ADLs, IADLs, and driving. He has been using a rollator for mobility due to knee pain. Patient supine in bed upon arrival, agreeable to OT evaluation. Reports pain 4/10 before session, 7/10 after session. RN aware. Ice pack applied to knee. BUE assessment reveals ROM, strength are WFL. Balance intact in sitting, fair in standing with support from RW.   Patient able to complete bed mobility with CGA, stand with CGA and minimal verbal cues for technique. Treatment initiated to include toileting, toilet transfer, and bathroom mobility. See below for assistance required. Patient is currently functioning below his independent baseline and would benefit from continued occupational therapy to increase independence and safety. Will follow. This section established at most recent assessment   PROBLEM LIST (Impairments causing functional limitations):  1. Decreased Strength  2. Decreased ADL/Functional Activities  3. Decreased Transfer Abilities  4. Decreased Ambulation Ability/Technique  5. Decreased Balance  6. Increased Pain  7. Decreased Activity Tolerance  8. Decreased Flexibility/Joint Mobility  9. Decreased Knowledge of Precautions   INTERVENTIONS PLANNED: (Benefits and precautions of occupational therapy have been discussed with the patient.)  1. Activities of daily living training  2. Adaptive equipment training  3. Group therapy  4. Therapeutic activity  5. Therapeutic exercise     TREATMENT PLAN: Frequency/Duration: Follow patient 5x/ week to address above goals. Rehabilitation Potential For Stated Goals: Good     RECOMMENDED REHABILITATION/EQUIPMENT: (at time of discharge pending progress): Due to the probability of continued deficits (see above) this patient will likely need continued skilled occupational therapy after discharge. Equipment:    None at this time              OCCUPATIONAL PROFILE AND HISTORY:   History of Present Injury/Illness (Reason for Referral):  S/p L TKA   Past Medical History/Comorbidities:   Mr. Mary Coronel  has a past medical history of Anxiety; Arthritis; Hypertension; Morbid obesity ([facility] [address] (01/10/2018); and Snores. Mr. Mary Coronel  has a past surgical history that includes hx orthopaedic (Right, ~2008) and hx orthopaedic (Left, ~2010).   Social History/Living Environment:   Home Environment: Private residence  # Steps to Enter: 3  One/Two Story Residence: One story  Living Alone: Yes  Support Systems: Family member(s)  Patient Expects to be Discharged to[de-identified] Private residence  Current DME Used/Available at Home: Ernesto Danica, rollator, Walker, rolling, Stewart Scarce, straight, Crutches, Shower chair  Tub or Shower Type: Tub/Shower combination  Prior Level of Function/Work/Activity:  Patient lives alone. He is independent with ADLs, IADLs, and driving prior to surgery. He was using a cane but has been using a rollator most recently. Dominant Side:         RIGHT  Previous Treatment Approaches: Foot surgery 10 years ago- outpatient PT   Personal Factors:          Sex:  male        Age:  62 y.o. Profession:  Disabled    Number of Personal Factors/Comorbidities that affect the Plan of Care: Brief history (0):  LOW COMPLEXITY   ASSESSMENT OF OCCUPATIONAL PERFORMANCE[de-identified]   Activities of Daily Living:           Basic ADLs (From Assessment) Complex ADLs (From Assessment)   Basic ADL  Feeding: Setup  Oral Facial Hygiene/Grooming: Setup  Bathing: Moderate assistance  Upper Body Dressing: Setup  Lower Body Dressing: Moderate assistance  Toileting: Minimum assistance Instrumental ADL  Meal Preparation: Maximum assistance  Homemaking: Maximum assistance  Medication Management: Independent  Financial Management: Independent   Grooming/Bathing/Dressing Activities of Daily Living   Grooming  Washing Hands: Supervision/set-up Cognitive Retraining  Safety/Judgement: Awareness of environment; Fall prevention; Insight into deficits; Decreased awareness of need for safety           Toileting  Toileting Assistance: Contact guard assistance  Bladder Hygiene: Supervision/set-up  Bowel Hygiene: Supervision/set-up  Clothing Management: Contact guard assistance     Functional Transfers  Bathroom Mobility: Minimum assistance  Toilet Transfer : Minimum assistance  Cues: Physical assistance;Verbal cues provided;Visual cues provided  Adaptive Equipment: Walker (comment)     Bed/Mat Mobility  Supine to Sit: Contact guard assistance  Sit to Stand: Contact guard assistance  Bed to Chair: Contact guard assistance  Scooting: Stand-by asssistance       Most Recent Physical Functioning:   Gross Assessment:  AROM: Within functional limits (BUE)  Strength: Within functional limits (BUE)               Posture:     Balance:  Sitting: Intact  Standing: Impaired  Standing - Static: Good  Standing - Dynamic : Fair Bed Mobility:  Supine to Sit: Contact guard assistance  Scooting: Stand-by asssistance  Wheelchair Mobility:     Transfers:  Sit to Stand: Contact guard assistance  Stand to Sit: Minimum assistance  Bed to Chair: Contact guard assistance                Patient Vitals for the past 6 hrs:   BP BP Patient Position SpO2 Pulse   18 0737 109/68 At rest 96 % 79       Mental Status  Neurologic State: Alert  Orientation Level: Oriented X4  Cognition: Follows commands, Appropriate decision making  Perception: Appears intact  Perseveration: No perseveration noted  Safety/Judgement: Awareness of environment, Fall prevention, Insight into deficits, Decreased awareness of need for safety                          Physical Skills Involved:  1. Range of Motion  2. Balance  3. Activity Tolerance  4. Pain (acute) Cognitive Skills Affected (resulting in the inability to perform in a timely and safe manner):  1. None Psychosocial Skills Affected:  1. Habits/Routines  2. Environmental Adaptation   Number of elements that affect the Plan of Care: 3-5:  MODERATE COMPLEXITY   CLINICAL DECISION MAKIN [facility] [address] [id number] AM-PAC 6 Clicks   Daily Activity Inpatient Short Form  How much help from another person does the patient currently need. .. Total A Lot A Little None   1. Putting on and taking off regular lower body clothing? [] 1   [x] 2   [] 3   [] 4   2. Bathing (including washing, rinsing, drying)? [] 1   [x] 2   [] 3   [] 4   3. Toileting, which includes using toilet, bedpan or urinal?   [] 1   [x] 2   [] 3   [] 4   4.   Putting on and taking off regular upper body clothing? [] 1   [] 2   [] 3   [x] 4   5. Taking care of personal grooming such as brushing teeth? [] 1   [] 2   [] 3   [x] 4   6. Eating meals? [] 1   [] 2   [] 3   [x] 4   © 2007, Trustees of [address] [place] [address] [address] [address], under license to [employer]. All rights reserved      Score:  Initial: 18 Most Recent: X (Date: -- )    Interpretation of Tool:  Represents activities that are increasingly more difficult (i.e. Bed mobility, Transfers, Gait). Score 24 23 22-20 19-15 14-10 9-7 6     Modifier CH CI CJ CK CL CM CN      ? Self Care:     - CURRENT STATUS: CK - 40%-59% impaired, limited or restricted    - GOAL STATUS: CJ - 20%-39% impaired, limited or restricted    - D/C STATUS:  ---------------To be determined---------------  Payor: BLUE CHOICE / [place] / Product Type: HMO /      Medical Necessity:     · Patient demonstrates good rehab potential due to higher previous functional level. Reason for Services/Other Comments:  · Patient continues to require present interventions due to patient's inability to care for self at pre-op level of function. Use of outcome tool(s) and clinical judgement create a POC that gives a: MODERATE COMPLEXITY         TREATMENT:   (In addition to Assessment/Re-Assessment sessions the following treatments were rendered)     Pre-treatment Symptoms/Complaints:  None  Pain: Initial:   Pain Intensity 1: 4 (increases to 7/10 with therapy)  Pain Location 1: Knee  Pain Intervention(s) 1: Ice  Post Session:  7/10     Self Care: (8 minutes): Procedure(s) (per grid) utilized to improve and/or restore self-care/home management as related to toileting and toilet transfer/ bathroom mobility. Required minimal verbal cueing to facilitate activities of daily living skills and compensatory activities.     Braces/Orthotics/Lines/Etc:   · IV  · O2 Device: Room air  Treatment/Session Assessment:    · Response to Treatment:  Tolerated well; motivated to participate · Interdisciplinary Collaboration:   o Occupational Therapist  o Registered Nurse  · After treatment position/precautions:   o Up in chair  o Bed/Chair-wheels locked  o Bed in low position  o Call light within reach  o RN notified   · Compliance with Program/Exercises: compliant all of the time. · Recommendations/Intent for next treatment session: \"Next visit will focus on advancements to more challenging activities and reduction in assistance provided\".   Total Treatment Duration:  OT Patient Time In/Time Out  Time In: 0924  Time Out: [address] [facility] [place]/ALICIA

## 2018-01-16 NOTE — PROGRESS NOTES
Problem: Falls - Risk of  Goal: *Absence of Falls  Document Ernesto Fall Risk and appropriate interventions in the flowsheet.    Outcome: Progressing Towards Goal  Fall Risk Interventions:  Mobility Interventions: Bed/chair exit alarm, Communicate number of staff needed for ambulation/transfer, Patient to call before getting OOB, PT Consult for mobility concerns, PT Consult for assist device competence         Medication Interventions: Bed/chair exit alarm, Evaluate medications/consider consulting pharmacy, Patient to call before getting OOB, Teach patient to arise slowly         History of Falls Interventions: Bed/chair exit alarm, Door open when patient unattended, Evaluate medications/consider consulting pharmacy, Investigate reason for fall, Room close to nurse's station

## 2018-01-16 NOTE — PROGRESS NOTES
TRANSFER - IN REPORT:    Verbal report received from Patti(name) on [employer]  being received from Namshi) for routine progression of care      Report consisted of patients Situation, Background, Assessment and   Recommendations(SBAR). Information from the following report(s) SBAR, Kardex, OR Summary, MAR, Accordion and Recent Results was reviewed with the receiving nurse. Opportunity for questions and clarification was provided. Assessment completed upon patients arrival to unit and care assumed.

## 2018-01-16 NOTE — PROGRESS NOTES
Dual skin assessment with [place]. Incsion to left knee. Dressing c/d/i. Dry, flaky skin generalized. Otherwise, skin unremarkable.

## 2018-01-16 NOTE — PROGRESS NOTES
Problem: Mobility Impaired (Adult and Pediatric)  Goal: *Acute Goals and Plan of Care (Insert Text)  Discharge Goals:  (1.)Mr. Martinez will perform bed mobility with INDEPENDENCE within 7 day(s). (2.)Mr. Martinez will transfer from bed to chair and chair to bed with INDEPENDENCE using the least restrictive device within 7 day(s). (3.)Mr. Martinez will ambulate with MODIFIED INDEPENDENCE for 150+ feet with the least restrictive device within 7 day(s). (4.)Mr. Martinez will ascend and descend 3 steps with SUPERVISION to safely enter his home within 7 day(s). (5.)Mr. Martinez will tolerate 25+ minutes of therapeutic activity/exercise while maintaining stable vitals to improve functional strength, ROM, and mobility within 7 day(s). ______________________________________________________________________________________________      PHYSICAL THERAPY: Daily Note, Treatment Day: Day of Assessment, PM 1/16/2018  INPATIENT: Hospital Day: 2  Payor: [place]UE CHO[place] / [place] / Product Type: HMO /      WBAT LE     NAME/AGE/GENDER: Nuvia Peterson is a 62 y.o. male   PRIMARY DIAGNOSIS: Osteoarthritis of left knee, unspecified osteoarthritis type [M17.12] Primary osteoarthritis of left knee Primary osteoarthritis of left knee  Procedure(s) (LRB):  KNEE ARTHROPLASTY TOTAL/ LEFT/ CAMARA AND NEPHEW (Left)  1 Day Post-Op  ICD-10: Treatment Diagnosis:   · Difficulty in walking, Not elsewhere classified (R26.2)   Precaution/Allergies:  Review of patient's allergies indicates no known allergies. ASSESSMENT:     Mr. Jennifer Sykes is a 62year old male 1 day s/ left total knee arthroplasty and is WBAT L LE. Patient seen this PM for physical therapy treatment session per BID plan of care: presents supine in bed, endorses 5-6/10 L LE pain, and agreeable. Performed bed mobility with CGA for L LE, transfers with CGA and additional time, and ambulation x75ft with RW and SBA.  Demonstrated a slow, step-through/step-to, antalgic gait pattern with fair+ dynamic balance throughout. Transitioned back into supine with ice pack applied L LE. Reviewed therapeutic exercise; patient verbalized understanding. Good progress this PM with patient increasing his gait distance. Will continue with BID plan of care. This section established at most recent assessment   PROBLEM LIST (Impairments causing functional limitations):  1. Decreased Strength  2. Decreased ADL/Functional Activities  3. Decreased Transfer Abilities  4. Decreased Ambulation Ability/Technique  5. Decreased Balance  6. Increased Pain  7. Decreased Activity Tolerance  8. Decreased Flexibility/Joint Mobility  9. Decreased Knowledge of Precautions  10. Decreased [place] with Home Exercise Program   INTERVENTIONS PLANNED: (Benefits and precautions of physical therapy have been discussed with the patient.)  1. Balance Exercise  2. Bed Mobility  3. Family Education  4. Gait Training  5. Home Exercise Program (HEP)  6. Range of Motion (ROM)  7. Therapeutic Activites  8. Therapeutic Exercise/Strengthening  9. Transfer Training  10. Group Therapy     TREATMENT PLAN: Frequency/Duration: twice daily for duration of hospital stay  Rehabilitation Potential For Stated Goals: Good     RECOMMENDED REHABILITATION/EQUIPMENT: (at time of discharge pending progress): Due to the probability of continued deficits (see above) this patient will likely need continued skilled physical therapy after discharge. Equipment:    None at this time PATIENT HAS RW for home use. HISTORY:   History of Present Injury/Illness (Reason for Referral):  S/p L TKA; WBAT LE [place]  Past Medical History/Comorbidities:   Mr. Minette Goldmann  has a past medical history of Anxiety; Arthritis; Hypertension; Morbid obesity ([facility] [address] (01/10/2018); and Snores. Mr. Minette Goldmann  has a past surgical history that includes hx orthopaedic (Right, ~2008) and hx orthopaedic (Left, ~2010).   Social History/Living Environment:   Home Environment: Private residence  # Steps to Enter: 3  One/Two Story Residence: One story  Living Alone: Yes  Support Systems: Family member(s)  Patient Expects to be Discharged to[de-identified] Private residence  Current DME Used/Available at Home: Walker, rolling, Walker, rollator, Crutches, Tsewart Scarce, straight, Shower chair  Tub or Shower Type: Tub/Shower combination  Prior Level of Function/Work/Activity:  Patient lives alone in a single story residence with 3 steps to enter. At baseline, patient is independent with ADLs, ambulates with a SPC vs. Rollator secondary to left knee pain, and endorses decreased balance and recent falls secondary to L LE deficits. Number of Personal Factors/Comorbidities that affect the Plan of Care: 1-2: MODERATE COMPLEXITY   EXAMINATION:   Most Recent Physical Functioning:   Gross Assessment:  AROM: Generally decreased, functional (L LE)  Strength: Generally decreased, functional (L LE)  Sensation: Intact (Light touch distal B LE)               Posture:  Posture (WDL): Within defined limits  Balance:  Sitting: Intact  Standing: Impaired  Standing - Static: Good  Standing - Dynamic : Fair Bed Mobility:  Supine to Sit: Contact guard assistance  Scooting: Stand-by asssistance  Wheelchair Mobility:     Transfers:  Sit to Stand: Contact guard assistance  Stand to Sit: Contact guard assistance  Gait:     Base of Support: Shift to right;Center of gravity altered  Speed/Janet: Slow;Shuffled  Step Length: Right shortened  Swing Pattern: Left asymmetrical  Stance: Right increased; Left decreased  Gait Abnormalities: Decreased step clearance;Shuffling gait; Step to gait  Distance (ft): 75 Feet (ft)  Assistive Device: Walker, rolling  Ambulation - Level of Assistance: Stand-by asssistance;Contact guard assistance  Interventions: Safety awareness training; Tactile cues; Verbal cues      Body Structures Involved:  1. Bones  2. Joints  3. Muscles  4. Ligaments Body Functions Affected:  1. Neuromusculoskeletal  2.  Movement Related Activities and Participation Affected:  1. Mobility  2. Self Care   Number of elements that affect the Plan of Care: 4+: HIGH COMPLEXITY   CLINICAL PRESENTATION:   Presentation: Stable and uncomplicated: LOW COMPLEXITY   CLINICAL DECISION MAKIN [facility] [address] [id number] AM-PAC 6 Clicks   Basic Mobility Inpatient Short Form  How much difficulty does the patient currently have. .. Unable A Lot A Little None   1. Turning over in bed (including adjusting bedclothes, sheets and blankets)? [] 1   [] 2   [] 3   [x] 4   2. Sitting down on and standing up from a chair with arms ( e.g., wheelchair, bedside commode, etc.)   [] 1   [] 2   [x] 3   [] 4   3. Moving from lying on back to sitting on the side of the bed? [] 1   [] 2   [x] 3   [] 4   How much help from another person does the patient currently need. .. Total A Lot A Little None   4. Moving to and from a bed to a chair (including a wheelchair)? [] 1   [] 2   [x] 3   [] 4   5. Need to walk in hospital room? [] 1   [] 2   [x] 3   [] 4   6. Climbing 3-5 steps with a railing? [] 1   [x] 2   [] 3   [] 4   © , Trustees of [address] [place] [address] [address] [id number], under license to [employer]. All rights reserved      Score:  Initial: 18 Most Recent: 18 (Date: 18 )    Interpretation of Tool:  Represents activities that are increasingly more difficult (i.e. Bed mobility, Transfers, Gait). Score 24 23 22-20 19-15 14-10 9-7 6     Modifier CH CI CJ CK CL CM CN      ? Mobility - Walking and Moving Around:     - CURRENT STATUS: CK - 40%-59% impaired, limited or restricted    - GOAL STATUS: CI - 1%-19% impaired, limited or restricted    - D/C STATUS:  ---------------To be determined---------------  Payor: BLUE CHOICE / [place] / Product Type: HMO /      Medical Necessity:     · Patient demonstrates good rehab potential due to higher previous functional level.   Reason for Services/Other Comments:  · Patient continues to require skilled intervention due to decreased functional mobility and balance/gait status from baseline. .   Use of outcome tool(s) and clinical judgement create a POC that gives a: Clear prediction of patient's progress: LOW COMPLEXITY            TREATMENT:   (In addition to Assessment/Re-Assessment sessions the following treatments were rendered)   Pre-treatment Symptoms/Complaints:    Pain: Initial:   Pain Intensity 1: 6  Pain Location 1: Knee  Pain Orientation 1: Left  Pain Intervention(s) 1: Ambulation/Increased Activity, Emotional support, Position, Cold pack  Post Session:  8/10 (RN notified and to administer pain medication)     Gait Training ( 16 Minutes):  Gait training to improve and/or restore physical functioning as related to mobility, strength and balance. Ambulated 75 Feet (ft) with stand-by assistance/contact guard assist and minimal visual, verbal and tactile cues related to their gait mechanics, dynamic standing balance, and RW negotiation. Assistive Device: Walker, rolling  Ambulation - Level of Assistance: Stand-by asssistance, Contact guard assistance  Distance (ft): 75 Feet (ft)  Interventions: Safety awareness training, Tactile cues, Verbal cues    Therapeutic Exercise: (0):  Exercises per grid below to improve mobility, strength and ROM. Required minimal visual and tactile cues to promote proper body alignment, promote proper body posture and promote proper body mechanics. Date:  1/16/18 Date:   Date:     Activity/Exercise Parameters Parameters Parameters   Ankle dorsiflexion x20B     Ankle plantarflexion x20B     Quad sets (3 second holds) x20L     Heel slides; knee flexion x10L AA                         Braces/Orthotics/Lines/Etc:   · IV  · ICE re-applied to L LE  · O2 Device: Room air  Treatment/Session Assessment:    · Response to Treatment:  See above.   · Interdisciplinary Collaboration:   o Physical Therapist  o Occupational Therapist  o Registered Nurse  · After treatment position/precautions:   o Supine in bed  o Bed/Chair-wheels locked  o Bed in low position  o Call light within reach  o RN notified  o Side rails x 3  o Needs met; patient comfortable   · Compliance with Program/Exercises: Will assess as treatment progresses. · Recommendations/Intent for next treatment session: \"Next visit will focus on advancements to more challenging activities and reduction in assistance provided\".     Total Treatment Duration:  PT Patient Time In/Time Out  Time In: 1402  Time Out: 705 [facility], [facility]

## 2018-01-16 NOTE — PROGRESS NOTES
Spiritual Care Visit, Initial visit,    Patient visited by Daniel Flores. Entered by Helio Jacobson M.Ed., Th.B., Staff .

## 2018-01-17 ENCOUNTER — HOME HEALTH ADMISSION (OUTPATIENT)
Dept: HOME HEALTH SERVICES | Facility: HOME HEALTH | Age: 58
End: 2018-01-17
Payer: COMMERCIAL

## 2018-01-17 VITALS
OXYGEN SATURATION: 99 % | DIASTOLIC BLOOD PRESSURE: 72 MMHG | TEMPERATURE: 98.6 F | RESPIRATION RATE: 20 BRPM | HEART RATE: 86 BPM | SYSTOLIC BLOOD PRESSURE: 115 MMHG

## 2018-01-17 LAB
MM INDURATION POC: 0 MM (ref 0–5)
PPD POC: NORMAL NEGATIVE

## 2018-01-17 PROCEDURE — 97530 THERAPEUTIC ACTIVITIES: CPT

## 2018-01-17 PROCEDURE — 97110 THERAPEUTIC EXERCISES: CPT

## 2018-01-17 PROCEDURE — 74011250636 HC RX REV CODE- 250/636: Performed by: ORTHOPAEDIC SURGERY

## 2018-01-17 PROCEDURE — 74011250637 HC RX REV CODE- 250/637: Performed by: ORTHOPAEDIC SURGERY

## 2018-01-17 RX ORDER — HYDROCODONE BITARTRATE AND ACETAMINOPHEN 10; 325 MG/1; MG/1
1 TABLET ORAL
Qty: 60 TAB | Refills: 0 | Status: SHIPPED | OUTPATIENT
Start: 2018-01-17

## 2018-01-17 RX ADMIN — ENOXAPARIN SODIUM 40 MG: 40 INJECTION SUBCUTANEOUS at 05:18

## 2018-01-17 RX ADMIN — METOPROLOL TARTRATE 25 MG: 25 TABLET ORAL at 08:29

## 2018-01-17 RX ADMIN — FLUOXETINE 20 MG: 20 CAPSULE ORAL at 08:28

## 2018-01-17 RX ADMIN — STANDARDIZED SENNA CONCENTRATE AND DOCUSATE SODIUM 2 TABLET: 8.6; 5 TABLET, FILM COATED ORAL at 08:28

## 2018-01-17 RX ADMIN — HYDROCODONE BITARTRATE AND ACETAMINOPHEN 1 TABLET: 7.5; 325 TABLET ORAL at 09:30

## 2018-01-17 RX ADMIN — HYDROCODONE BITARTRATE AND ACETAMINOPHEN 1 TABLET: 7.5; 325 TABLET ORAL at 05:18

## 2018-01-17 RX ADMIN — AMLODIPINE BESYLATE 2.5 MG: 5 TABLET ORAL at 08:28

## 2018-01-17 RX ADMIN — CELECOXIB 200 MG: 200 CAPSULE ORAL at 05:18

## 2018-01-17 RX ADMIN — HYDROCHLOROTHIAZIDE 25 MG: 25 TABLET ORAL at 08:28

## 2018-01-17 RX ADMIN — Medication 10 ML: at 05:18

## 2018-01-17 RX ADMIN — ROSUVASTATIN CALCIUM 10 MG: 20 TABLET, FILM COATED ORAL at 08:28

## 2018-01-17 RX ADMIN — ASPIRIN 325 MG: 325 TABLET, DELAYED RELEASE ORAL at 08:29

## 2018-01-17 RX ADMIN — LISINOPRIL 40 MG: 20 TABLET ORAL at 08:27

## 2018-01-17 NOTE — PROGRESS NOTES
Care Management Interventions  Mode of Transport at Discharge: Other (see comment) (family car)  Transition of Care Consult (CM Consult): Home Health ([address] )  [facility] - INPATIENT: Yes  Discharge Durable Medical Equipment: No (Pt has rollator and cane at home)  Physical Therapy Consult: Yes  Occupational Therapy Consult: Yes  Current Support Network: Own Home, Lives Alone  Plan discussed with Pt/Family/Caregiver: Yes  Freedom of Choice Offered: Yes  Discharge Location  Discharge Placement: Home with [facility])    Met with patient to discuss discharge to home today. S/P L TKA. Lives at home alone. At baseline he is independent with self care, ambulation and driving. Recently was using a rollator for ambulation. HH PT indicated. Pt agreeable to [facility] [place]. Referral made. Pt has a rollator and cane at home. Family available to transport home. No other dc needs indicated.

## 2018-01-17 NOTE — PROGRESS NOTES
Problem: Mobility Impaired (Adult and Pediatric)  Goal: *Acute Goals and Plan of Care (Insert Text)  Discharge Goals:  (1.)Mr. Martinez will perform bed mobility with INDEPENDENCE within 7 day(s). (2.)Mr. Martinez will transfer from bed to chair and chair to bed with INDEPENDENCE using the least restrictive device within 7 day(s). (3.)Mr. Martinez will ambulate with MODIFIED INDEPENDENCE for 150+ feet with the least restrictive device within 7 day(s). (4.)Mr. Martinez will ascend and descend 3 steps with SUPERVISION to safely enter his home within 7 day(s). (5.)Mr. Martinez will tolerate 25+ minutes of therapeutic activity/exercise while maintaining stable vitals to improve functional strength, ROM, and mobility within 7 day(s). ______________________________________________________________________________________________      PHYSICAL THERAPY: Daily Note, Treatment Day: 1st, AM 1/17/2018  INPATIENT: Hospital Day: 3  Payor: BLUE CHO[place] / [place] / Product Type: HMO /      WBAT LE     NAME/AGE/GENDER: Darren Martinez is a 62 y.o. male   PRIMARY DIAGNOSIS: Osteoarthritis of left knee, unspecified osteoarthritis type [M17.12] Primary osteoarthritis of left knee Primary osteoarthritis of left knee  Procedure(s) (LRB):  KNEE ARTHROPLASTY TOTAL/ LEFT/ CAMARA AND NEPHEW (Left)  2 Days Post-Op  ICD-10: Treatment Diagnosis:   · Difficulty in walking, Not elsewhere classified (R26.2)   Precaution/Allergies:  Review of patient's allergies indicates no known allergies. ASSESSMENT:     Mr. Makayla Doll is a 62year old male s/p left total knee arthroplasty and is WBAT L LE. Patient presents sitting up in chair and states he just walked down the byrne with the other therapist, so declined further ambulation at this time. Pt performed below LE exercises in chair and was left sitting up in chair. Ice pack to L knee with chair reclined. Pt is moving well and is making good progress. Pt is expecting to go home later today.   Will continue with POC. This section established at most recent assessment   PROBLEM LIST (Impairments causing functional limitations):  1. Decreased Strength  2. Decreased ADL/Functional Activities  3. Decreased Transfer Abilities  4. Decreased Ambulation Ability/Technique  5. Decreased Balance  6. Increased Pain  7. Decreased Activity Tolerance  8. Decreased Flexibility/Joint Mobility  9. Decreased Knowledge of Precautions  10. Decreased [place] with Home Exercise Program   INTERVENTIONS PLANNED: (Benefits and precautions of physical therapy have been discussed with the patient.)  1. Balance Exercise  2. Bed Mobility  3. Family Education  4. Gait Training  5. Home Exercise Program (HEP)  6. Range of Motion (ROM)  7. Therapeutic Activites  8. Therapeutic Exercise/Strengthening  9. Transfer Training  10. Group Therapy     TREATMENT PLAN: Frequency/Duration: twice daily for duration of hospital stay  Rehabilitation Potential For Stated Goals: Good     RECOMMENDED REHABILITATION/EQUIPMENT: (at time of discharge pending progress): Due to the probability of continued deficits (see above) this patient will likely need continued skilled physical therapy after discharge. Equipment:    None at this time PATIENT HAS RW for home use. HISTORY:   History of Present Injury/Illness (Reason for Referral):  S/p L TKA; WBAT LE [place]  Past Medical History/Comorbidities:   Mr. Fide Aguilar  has a past medical history of Anxiety; Arthritis; Hypertension; Morbid obesity ([facility] [address] (01/10/2018); and Snores. Mr. Fide Aguilar  has a past surgical history that includes hx orthopaedic (Right, ~2008) and hx orthopaedic (Left, ~2010).   Social History/Living Environment:   Home Environment: Private residence  # Steps to Enter: 3  One/Two Story Residence: One story  Living Alone: Yes  Support Systems: Family member(s)  Patient Expects to be Discharged to[de-identified] Private residence  Current DME Used/Available at Home: Ernesto Ocal[place], Elder Inna, Ernesto Danica, irmaator, Crutches, Cane, straight, Shower chair  Tub or Shower Type: Tub/Shower combination  Prior Level of Function/Work/Activity:  Patient lives alone in a single story residence with 3 steps to enter. At baseline, patient is independent with ADLs, ambulates with a SPC vs. Rollator secondary to left knee pain, and endorses decreased balance and recent falls secondary to L LE deficits. Number of Personal Factors/Comorbidities that affect the Plan of Care: 1-2: MODERATE COMPLEXITY   EXAMINATION:   Most Recent Physical Functioning:   Gross Assessment:                  Posture:     Balance:    Bed Mobility:     Wheelchair Mobility:     Transfers:     Gait:            Body Structures Involved:  1. Bones  2. Joints  3. Muscles  4. Ligaments Body Functions Affected:  1. Neuromusculoskeletal  2. Movement Related Activities and Participation Affected:  1. Mobility  2. Self Care   Number of elements that affect the Plan of Care: 4+: HIGH COMPLEXITY   CLINICAL PRESENTATION:   Presentation: Stable and uncomplicated: LOW COMPLEXITY   CLINICAL DECISION MAKIN[address] [place] [id number] AM-PAC 6 Clicks   Basic Mobility Inpatient Short Form  How much difficulty does the patient currently have. .. Unable A Lot A Little None   1. Turning over in bed (including adjusting bedclothes, sheets and blankets)? [] 1   [] 2   [] 3   [x] 4   2. Sitting down on and standing up from a chair with arms ( e.g., wheelchair, bedside commode, etc.)   [] 1   [] 2   [x] 3   [] 4   3. Moving from lying on back to sitting on the side of the bed? [] 1   [] 2   [x] 3   [] 4   How much help from another person does the patient currently need. .. Total A Lot A Little None   4. Moving to and from a bed to a chair (including a wheelchair)? [] 1   [] 2   [x] 3   [] 4   5. Need to walk in hospital room? [] 1   [] 2   [x] 3   [] 4   6. Climbing 3-5 steps with a railing?    [] 1   [x] 2   [] 3   [] 4   © , Trustees of [address] [place] [id number], under license to [employer]. All rights reserved      Score:  Initial: 18 Most Recent: 18 (Date: 1/16/18 )    Interpretation of Tool:  Represents activities that are increasingly more difficult (i.e. Bed mobility, Transfers, Gait). Score 24 23 22-20 19-15 14-10 9-7 6     Modifier CH CI CJ CK CL CM CN      ? Mobility - Walking and Moving Around:     - CURRENT STATUS: CK - 40%-59% impaired, limited or restricted    - GOAL STATUS: CI - 1%-19% impaired, limited or restricted    - D/C STATUS:  ---------------To be determined---------------  Payor[place] BLUE CHO[place] / [place] / Product Type: HMO /      Medical Necessity:     · Patient demonstrates good rehab potential due to higher previous functional level. Reason for Services/Other Comments:  · Patient continues to require skilled intervention due to decreased functional mobility and balance/gait status from baseline. .   Use of outcome tool(s) and clinical judgement create a POC that gives a: Clear prediction of patient's progress: LOW COMPLEXITY            TREATMENT:      Pre-treatment Symptoms/Complaints:    Pain: Initial:      Post Session:  8/10 (RN notified and to administer pain medication)     Gait Training ( 0 Minutes):  Gait training to improve and/or restore physical functioning as related to mobility, strength and balance. Ambulated   with stand-by assistance/contact guard assist and minimal visual, verbal and tactile cues related to their gait mechanics, dynamic standing balance, and RW negotiation. Assistive Device: Walker, rolling  Ambulation - Level of Assistance: Stand-by asssistance, Contact guard assistance  Distance (ft): 75 Feet (ft)  Interventions: Safety awareness training, Tactile cues, Verbal cues    Therapeutic Exercise: (14 minutes):  Exercises per grid below to improve mobility, strength and ROM.   Required minimal visual and tactile cues to promote proper body alignment, promote proper body posture and promote proper body mechanics. Date:  1/16/18 Date:  117/18 Date:     Activity/Exercise Parameters AM Parameters   Ankle dorsiflexion x20B X 20 B    Ankle plantarflexion x20B X 20 B    Quad sets (3 second holds) x20L X 20 B    Heel slides; knee flexion x10L AA     Hip abd  X 15 L AA    LAQ  X 10 L AA            Braces/Orthotics/Lines/Etc:   · IV  · ICE re-applied to L LE  · O2 Device: Room air  Treatment/Session Assessment:    · Response to Treatment:  See above. · Interdisciplinary Collaboration:   o Physical Therapy Assistant  o Registered Nurse  · After treatment position/precautions:   o Up in chair  o Bed/Chair-wheels locked  o Call light within reach  o RN notified  o Needs met; patient comfortable   · Compliance with Program/Exercises: Will assess as treatment progresses. · Recommendations/Intent for next treatment session: \"Next visit will focus on advancements to more challenging activities and reduction in assistance provided\".     Total Treatment Duration:  PT Patient Time In/Time Out  Time In: 0940  Time Out: [address] 28, PTA

## 2018-01-17 NOTE — PROGRESS NOTES
[facility] - INPATIENT  Face to Face Encounter    Patients Name: Frank Cruz    YOB: 1960    Ordering Physician: Dr Ramon Maher    Primary Diagnosis: Osteoarthritis of left knee, unspecified osteoarthritis type [M17.12]    Date of Face to Face:   1/17/2018                                  Face to Face Encounter findings are related to primary reason for home care: YES.     1. I certify that the patient needs intermittent care as follows: physical therapy: stretching/ROM, gait/stair training and pt/caregiver education    2. I certify that this patient is homebound, that is: 1) patient requires the use of a walker device, special transportation, or assistance of another to leave the home; or 2) patient's condition makes leaving the home medically contraindicated; and 3) patient has a normal inability to leave the home and leaving the home requires considerable and taxing effort. Patient may leave the home for infrequent and short duration for medical reasons, and occasional absences for non-medical reasons. Homebound status is due to the following functional limitations: Limited ambulation secondary to ENDURANCE, ROM, PAIN. Ambulation limited to 75 feet with the use of a RW (assistive device). Patient currently under activity restrictions secondary to recent surgical procedure, this hinders their ability to safely leave the home. 3. I certify that this patient is under my care and that I, or a nurse practitioner or  [id number], or clinical nurse specialist, or certified nurse midwife, working with me, had a Face-to-Face Encounter that meets the physician Face-to-Face Encounter requirements.   The following are the clinical findings from the [address] [place] [address] encounter that support the need for skilled services and is a summary of the encounter: SEE MEDICAL RECORD    See attached progess note      Bernard Cranker, MSW  1/17/2018      THE FOLLOWING TO BE COMPLETED BY THE COMMUNITY PHYSICIAN:    I concur with the findings described above from the F2F encounter that this patient is homebound and in need of a skilled service.     Certifying Physician: _____________________________________      Printed Certifying Physician Name: _____________________________________    Date: _________________

## 2018-01-17 NOTE — PROGRESS NOTES
Discharge instructions and prescriptions given and explained to pt. Pt verbalized understanding. Medication side effects sheet reviewed with pt. Pt to be discharged home, calling his ride now.

## 2018-01-17 NOTE — PROGRESS NOTES
Problem: Self Care Deficits Care Plan (Adult)  Goal: *Acute Goals and Plan of Care (Insert Text)  1. Patient will complete lower body bathing and dressing with setup and adaptive equipment as needed. 2. Patient will complete functional transfers with modified independence and adaptive equipment as needed. 3. Patient will complete toileting with modified independence. 4. Patient will tolerate at least 20 minutes of BUE therapeutic exercises to strengthen BUE to aid in functional transfers. 5. Patient will complete functional mobility of household distances with modified independence  and adaptive equipment as needed. Timeframe: 7 visits       OCCUPATIONAL THERAPY: Daily Note, Treatment Day: 2nd and AM    1/17/2018  INPATIENT: Hospital Day: 3  Payor: BLUE CHOICE / [place] / Product Type: HMO /      NAME/AGE/GENDER: Shaina Hand is a 62 y.o. male   PRIMARY DIAGNOSIS:  Osteoarthritis of left knee, unspecified osteoarthritis type [M17.12] Primary osteoarthritis of left knee Primary osteoarthritis of left knee  Procedure(s) (LRB):  KNEE ARTHROPLASTY TOTAL/ LEFT/ CAMARA AND NEPHEW (Left)  2 Days Post-Op  ICD-10: Treatment Diagnosis:    · Pain in Left Knee (M25.562)  · Stiffness of Left Knee, Not elsewhere classified (B82.270)   Precautions/Allergies:    WBAT LLE    Review of patient's allergies indicates no known allergies. ASSESSMENT:     Mr. Zara Bangura is a 62year old male who is now s/p L TKA and WBAT in LLE. At baseline patient lives alone and is typically independent with ADLs, IADLs, and driving. He has been using a rollator for mobility due to knee pain. 1/17/2018 Pt presents in supine upon arrival. Pt transferred to sitting with CGA. Pt completed sit to stand with a rolling walker and SBA and completed FM into the hallway with SBA and a RW. Pt returned to room and was given a rest break and then participated in UE exercises. Pt was left up in the chair with belongings in reach. Good effort. Continue OT POC. This section established at most recent assessment   PROBLEM LIST (Impairments causing functional limitations):  1. Decreased Strength  2. Decreased ADL/Functional Activities  3. Decreased Transfer Abilities  4. Decreased Ambulation Ability/Technique  5. Decreased Balance  6. Increased Pain  7. Decreased Activity Tolerance  8. Decreased Flexibility/Joint Mobility  9. Decreased Knowledge of Precautions   INTERVENTIONS PLANNED: (Benefits and precautions of occupational therapy have been discussed with the patient.)  1. Activities of daily living training  2. Adaptive equipment training  3. Group therapy  4. Therapeutic activity  5. Therapeutic exercise     TREATMENT PLAN: Frequency/Duration: Follow patient 5x/ week to address above goals. Rehabilitation Potential For Stated Goals: Good     RECOMMENDED REHABILITATION/EQUIPMENT: (at time of discharge pending progress): Due to the probability of continued deficits (see above) this patient will likely need continued skilled occupational therapy after discharge. Equipment:    None at this time              OCCUPATIONAL PROFILE AND HISTORY:   History of Present Injury/Illness (Reason for Referral):  S/p L TKA   Past Medical History/Comorbidities:   Mr. Lianet Lu  has a past medical history of Anxiety; Arthritis; Hypertension; Morbid obesity ([facility] [address] (01/10/2018); and Snores. Mr. Lianet Lu  has a past surgical history that includes hx orthopaedic (Right, ~2008) and hx orthopaedic (Left, ~2010). Social History/Living Environment:   Home Environment: Private residence  # Steps to Enter: 3  One/Two Story Residence: One story  Living Alone: Yes  Support Systems: Family member(s)  Patient Expects to be Discharged to[de-identified] Private residence  Current DME Used/Available at Home: Walker, rolling, Walker, rollator, Crutches, [place], straight, Shower chair  Tub or Shower Type: Tub/Shower combination  Prior Level of Function/Work/Activity:  Patient lives alone.  He is independent with ADLs, IADLs, and driving prior to surgery. He was using a cane but has been using a rollator most recently. Dominant Side:         RIGHT  Previous Treatment Approaches: Foot surgery 10 years ago- outpatient PT   Personal Factors:          Sex:  male        Age:  62 y.o. Profession:  Disabled    Number of Personal Factors/Comorbidities that affect the Plan of Care: Brief history (0):  LOW COMPLEXITY   ASSESSMENT OF OCCUPATIONAL PERFORMANCE[de-identified]   Activities of Daily Living:           Basic ADLs (From Assessment) Complex ADLs (From Assessment)   Basic ADL  Feeding: Setup  Oral Facial Hygiene/Grooming: Setup  Bathing: Moderate assistance  Upper Body Dressing: Setup  Lower Body Dressing:  Moderate assistance  Toileting: Minimum assistance Instrumental ADL  Meal Preparation: Maximum assistance  Homemaking: Maximum assistance  Medication Management: Independent  Financial Management: Independent   Grooming/Bathing/Dressing Activities of Daily Living                             Bed/Mat Mobility  Supine to Sit: Contact guard assistance  Sit to Stand: Contact guard assistance  Scooting: Stand-by asssistance       Most Recent Physical Functioning:   Gross Assessment:                  Posture:  Posture (WDL): Within defined limits  Balance:  Sitting: Intact  Standing: Impaired  Standing - Static: Good  Standing - Dynamic : Fair Bed Mobility:  Supine to Sit: Contact guard assistance  Scooting: Stand-by asssistance  Wheelchair Mobility:     Transfers:  Sit to Stand: Contact guard assistance  Stand to Sit: Contact guard assistance                Patient Vitals for the past 6 hrs:   BP BP Patient Position SpO2 Pulse   01/17/18 0513 115/56 At rest 94 % 80   01/17/18 0802 115/72 At rest 99 % 86       Mental Status  Neurologic State: Alert, Appropriate for age  Orientation Level: Oriented X4  Cognition: Follows commands  Perception: Appears intact  Perseveration: No perseveration noted  Safety/Judgement: Awareness of environment, Fall prevention, Insight into deficits, Decreased awareness of need for safety                          Physical Skills Involved:  1. Range of Motion  2. Balance  3. Activity Tolerance  4. Pain (acute) Cognitive Skills Affected (resulting in the inability to perform in a timely and safe manner):  1. None Psychosocial Skills Affected:  1. Habits/Routines  2. Environmental Adaptation   Number of elements that affect the Plan of Care: 3-5:  MODERATE COMPLEXITY   CLINICAL DECISION MAKIN[address] [place] [address] AM-PAC 6 Clicks   Daily Activity Inpatient Short Form  How much help from another person does the patient currently need. .. Total A Lot A Little None   1. Putting on and taking off regular lower body clothing? [] 1   [x] 2   [] 3   [] 4   2. Bathing (including washing, rinsing, drying)? [] 1   [x] 2   [] 3   [] 4   3. Toileting, which includes using toilet, bedpan or urinal?   [] 1   [x] 2   [] 3   [] 4   4. Putting on and taking off regular upper body clothing? [] 1   [] 2   [] 3   [x] 4   5. Taking care of personal grooming such as brushing teeth? [] 1   [] 2   [] 3   [x] 4   6. Eating meals? [] 1   [] 2   [] 3   [x] 4   © , Trustees of [address] [place] [address], under license to [email or web address]. All rights reserved      Score:  Initial: 18 Most Recent: X (Date: -- )    Interpretation of Tool:  Represents activities that are increasingly more difficult (i.e. Bed mobility, Transfers, Gait). Score 24 23 22-20 19-15 14-10 9-7 6     Modifier CH CI CJ CK CL CM CN      ? Self Care:     - CURRENT STATUS: CK - 40%-59% impaired, limited or restricted    - GOAL STATUS: CJ - 20%-39% impaired, limited or restricted    - D/C STATUS:  ---------------To be determined---------------  Payor: BLUE CHOICE / [place] / Product Type: HMO /      Medical Necessity:     · Patient demonstrates good rehab potential due to higher previous functional level.   Reason for Services/Other Comments:  · Patient continues to require present interventions due to patient's inability to care for self at pre-op level of function. Use of outcome tool(s) and clinical judgement create a POC that gives a: MODERATE COMPLEXITY         TREATMENT:   (In addition to Assessment/Re-Assessment sessions the following treatments were rendered)     Pre-treatment Symptoms/Complaints:  None  Pain: Initial:   Pain Intensity 1: 0  Post Session:  7/10   Therapeutic Activity: (10 minutes): Therapeutic activities including Bed transfers, Chair transfers, Toilet transfers and static/dynamic standing  to improve mobility, strength, balance and coordination. Required SBA to promote static and dynamic balance in standing. Therapeutic Exercise: (13 minutes):  Exercises per grid below to improve mobility and strength. Required minimal visual and verbal cues to promote proper body mechanics. Progressed resistance, range and repetitions as indicated. Date:  1/1718 Date:   Date:     Activity/Exercise Parameters Parameters Parameters   Shoulder flexion/extension 2 sets of 20 reps with green theraband     Shoulder presses 2 sets of 20 reps with green theraband     Shoulder horizontal add/abd 2 sets of 20 reps with green theraband     Elbow flexion/extension 2 sets of 20 reps with green theraband     Tricep extension 2 sets of 20 reps with green theraband                                 Braces/Orthotics/Lines/Etc:   · IV  · O2 Device: Room air  Treatment/Session Assessment:    · Response to Treatment:  Tolerated well; motivated to participate   · Interdisciplinary Collaboration:   o Certified Occupational Therapy Assistant  o Registered Nurse  · After treatment position/precautions:   o Up in chair  o Bed/Chair-wheels locked  o Bed in low position  o Call light within reach  o RN notified   · Compliance with Program/Exercises: compliant all of the time. · Recommendations/Intent for next treatment session:   \"Next visit will focus on advancements to more challenging activities and reduction in assistance provided\".   Total Treatment Duration:  OT Patient Time In/Time Out  Time In: 0915  Time Out: [address]

## 2018-01-17 NOTE — DISCHARGE SUMMARY
Total Joint Discharge Summary    Patient: Shaina Hand MRN: [id number]  SSN: [id number]    YOB: 1960  Age: 62 y.o. Sex: male      Admit Date: 1/15/2018  Discharge Date:1/17/2018  Admitting Physician: Chip Berg MD   Discharge Physician: Chip Berg MD   Admission Diagnoses: Osteoarthritis of left knee, unspecified osteoarthritis type [M17.12]   Discharge Diagnoses:   Patient Active Problem List   Diagnosis Code    Primary osteoarthritis of left knee M17.12    Left knee DJD M17.12     Surgeon: Surgeon(s):  Chip Berg MD   DVT Prophylaxis:  Lovenox ASA                                  DILLON Hose  Postoperative Complications: none detected  Last Hemoglobin:   Lab Results   Component Value Date/Time    HGB 10.7 01/16/2018 09:02 AM        Wound appears to be healing without any evidence of infection. Physical Therapy started on the day following surgery and progressed to independent ambulation with the aid of a walker. At the time of discharge, patient is able to go up and down stairs and has understanding of precautions needed following surgery. Discharged Disposition: Home    Discharge Instructions:   Anticoagulate with Ecotrin 325 mg orally once a day for 4 weeks   Resume pre-hospital diet             Resume home medications per medical continuation form      Ambulate with walker, appropriate total joint protocol   Follow up in office as scheduled    Call doctor immediately if temperature is greater hdoi353.5°F, increased pain, swelling, drainage.    If shortness of breath or chest pain, immediately go to the Emergency Department    Signed By: Chip Berg MD     January 17, 2018

## 2018-01-17 NOTE — PROGRESS NOTES
Pt discharged from [facility]. Pt refused flu vaccine. Pt left via wheelchair by CNA.

## 2018-01-17 NOTE — DISCHARGE INSTRUCTIONS
DO NOT remove the dressing on your knee until [address] visits    MAY shower    ACTIVITY as tolerated    NO driving until cleared by Dr. Rafaela Arzate if [phone]):  Fever >100.5             Incision becomes red,  swollen or opens up                     Incision has yellow, thick drainage or an odor                    Pain is not managed with prescribed medications                    Excessive nausea and/or vomiting    Report to DR. JESSICA  redness ,swelling or pain  in your calf, back of knee, thigh or groin    Go to emergency room for sudden chest pain and difficulty breathing        Avoid having pets sleep in bed with you until incision is completely healed    DISCHARGE SUMMARY from Nurse    PATIENT INSTRUCTIONS:    After general anesthesia or intravenous sedation, for 24 hours or while taking prescription Narcotics:  · Limit your activities  · Do not drive and operate hazardous machinery  · Do not make important personal or business decisions  · Do  not drink alcoholic beverages  · If you have not urinated within 8 hours after discharge, please contact your surgeon on call. Report the following to your surgeon:  · Excessive pain, swelling, redness or odor of or around the surgical area  · Temperature over 100.5  · Nausea and vomiting lasting longer than 4 hours or if unable to take medications  · Any signs of decreased circulation or nerve impairment to extremity: change in color, persistent  numbness, tingling, coldness or increase pain  · Any questions    What to do at Home:  Recommended activity: See surgical instructions, diet as tolerated. *  Please give a list of your current medications to your Primary Care Provider. *  Please update this list whenever your medications are discontinued, doses are      changed, or new medications (including over-the-counter products) are added. *  Please carry medication information at all times in case of emergency situations.     These are general instructions for a healthy lifestyle:    No smoking/ No tobacco products/ Avoid exposure to second hand smoke  Surgeon General's Warning:  Quitting smoking now greatly reduces serious risk to your health. Obesity, smoking, and sedentary lifestyle greatly increases your risk for illness    A healthy diet, regular physical exercise & weight monitoring are important for maintaining a healthy lifestyle    You may be retaining fluid if you have a history of heart failure or if you experience any of the following symptoms:  Weight gain of 3 pounds or more overnight or 5 pounds in a week, increased swelling in our hands or feet or shortness of breath while lying flat in bed. Please call your doctor as soon as you notice any of these symptoms; do not wait until your next office visit. Recognize signs and symptoms of STROKE:    F-face looks uneven    A-arms unable to move or move unevenly    S-speech slurred or non-existent    T-time-call 911 as soon as signs and symptoms begin-DO NOT go       Back to bed or wait to see if you get better-TIME IS BRAIN. Warning Signs of HEART ATTACK     Call 911 if you have these symptoms:   Chest discomfort. Most heart attacks involve discomfort in the center of the chest that lasts more than a few minutes, or that goes away and comes back. It can feel like uncomfortable pressure, squeezing, fullness, or pain.  Discomfort in other areas of the upper body. Symptoms can include pain or discomfort in one or both arms, the back, neck, jaw, or stomach.  Shortness of breath with or without chest discomfort.  Other signs may include breaking out in a cold sweat, nausea, or lightheadedness. Don't wait more than five minutes to call 911 - MINUTES MATTER! Fast action can save your life. Calling 911 is almost always the fastest way to get lifesaving treatment.  Emergency Medical Services staff can begin treatment when they arrive -- up to an hour sooner than if someone gets to the [facility] by car. The discharge information has been reviewed with the patient. The patient verbalized understanding. Discharge medications reviewed with the patient and appropriate educational materials and side effects teaching were provided.   ___________________________________________________________________________________________________________________________________

## 2018-01-19 ENCOUNTER — HOME CARE VISIT (OUTPATIENT)
Dept: SCHEDULING | Facility: HOME HEALTH | Age: 58
End: 2018-01-19
Payer: COMMERCIAL

## 2018-01-19 VITALS
RESPIRATION RATE: 18 BRPM | DIASTOLIC BLOOD PRESSURE: 61 MMHG | HEART RATE: 62 BPM | TEMPERATURE: 99 F | SYSTOLIC BLOOD PRESSURE: 112 MMHG

## 2018-01-19 PROCEDURE — 400013 HH SOC

## 2018-01-19 PROCEDURE — G0151 HHCP-SERV OF PT,EA 15 MIN: HCPCS

## 2018-01-22 ENCOUNTER — HOME CARE VISIT (OUTPATIENT)
Dept: HOME HEALTH SERVICES | Facility: HOME HEALTH | Age: 58
End: 2018-01-22
Payer: COMMERCIAL

## 2018-01-22 ENCOUNTER — HOME CARE VISIT (OUTPATIENT)
Dept: SCHEDULING | Facility: HOME HEALTH | Age: 58
End: 2018-01-22
Payer: COMMERCIAL

## 2018-01-22 VITALS
HEART RATE: 85 BPM | RESPIRATION RATE: 18 BRPM | TEMPERATURE: 97.5 F | DIASTOLIC BLOOD PRESSURE: 68 MMHG | SYSTOLIC BLOOD PRESSURE: 126 MMHG

## 2018-01-22 PROCEDURE — G0157 HHC PT ASSISTANT EA 15: HCPCS

## 2018-01-24 ENCOUNTER — HOME CARE VISIT (OUTPATIENT)
Dept: SCHEDULING | Facility: HOME HEALTH | Age: 58
End: 2018-01-24
Payer: COMMERCIAL

## 2018-01-24 VITALS
DIASTOLIC BLOOD PRESSURE: 62 MMHG | TEMPERATURE: 97.8 F | SYSTOLIC BLOOD PRESSURE: 126 MMHG | HEART RATE: 61 BPM | RESPIRATION RATE: 16 BRPM

## 2018-01-24 PROCEDURE — G0157 HHC PT ASSISTANT EA 15: HCPCS

## 2018-01-25 ENCOUNTER — HOME CARE VISIT (OUTPATIENT)
Dept: SCHEDULING | Facility: HOME HEALTH | Age: 58
End: 2018-01-25
Payer: COMMERCIAL

## 2018-01-25 VITALS
SYSTOLIC BLOOD PRESSURE: 122 MMHG | DIASTOLIC BLOOD PRESSURE: 72 MMHG | RESPIRATION RATE: 18 BRPM | TEMPERATURE: 97.1 F | HEART RATE: 71 BPM

## 2018-01-25 PROCEDURE — G0157 HHC PT ASSISTANT EA 15: HCPCS

## 2018-01-29 PROCEDURE — A4649 SURGICAL SUPPLIES: HCPCS

## 2018-01-30 ENCOUNTER — HOME CARE VISIT (OUTPATIENT)
Dept: SCHEDULING | Facility: HOME HEALTH | Age: 58
End: 2018-01-30
Payer: COMMERCIAL

## 2018-01-30 VITALS
RESPIRATION RATE: 18 BRPM | HEART RATE: 60 BPM | SYSTOLIC BLOOD PRESSURE: 130 MMHG | TEMPERATURE: 97.4 F | DIASTOLIC BLOOD PRESSURE: 72 MMHG

## 2018-01-30 PROCEDURE — G0157 HHC PT ASSISTANT EA 15: HCPCS

## 2018-02-01 ENCOUNTER — HOME CARE VISIT (OUTPATIENT)
Dept: SCHEDULING | Facility: HOME HEALTH | Age: 58
End: 2018-02-01
Payer: COMMERCIAL

## 2018-02-01 VITALS
SYSTOLIC BLOOD PRESSURE: 122 MMHG | RESPIRATION RATE: 16 BRPM | HEART RATE: 69 BPM | DIASTOLIC BLOOD PRESSURE: 76 MMHG | TEMPERATURE: 97.3 F

## 2018-02-01 PROCEDURE — G0157 HHC PT ASSISTANT EA 15: HCPCS

## 2018-02-05 ENCOUNTER — HOME CARE VISIT (OUTPATIENT)
Dept: SCHEDULING | Facility: HOME HEALTH | Age: 58
End: 2018-02-05
Payer: COMMERCIAL

## 2018-02-05 VITALS
RESPIRATION RATE: 16 BRPM | SYSTOLIC BLOOD PRESSURE: 126 MMHG | DIASTOLIC BLOOD PRESSURE: 78 MMHG | TEMPERATURE: 98 F | HEART RATE: 70 BPM

## 2018-02-05 PROCEDURE — G0157 HHC PT ASSISTANT EA 15: HCPCS

## 2018-02-08 ENCOUNTER — HOME CARE VISIT (OUTPATIENT)
Dept: SCHEDULING | Facility: HOME HEALTH | Age: 58
End: 2018-02-08
Payer: COMMERCIAL

## 2018-02-08 VITALS — DIASTOLIC BLOOD PRESSURE: 70 MMHG | HEART RATE: 66 BPM | RESPIRATION RATE: 17 BRPM | SYSTOLIC BLOOD PRESSURE: 124 MMHG

## 2018-02-08 PROCEDURE — G0151 HHCP-SERV OF PT,EA 15 MIN: HCPCS

## 2019-12-28 ENCOUNTER — HOSPITAL ENCOUNTER (EMERGENCY)
Age: 59
Discharge: HOME | End: 2019-12-28
Attending: EMERGENCY MEDICINE
Payer: SELF-PAY

## 2019-12-28 VITALS
BODY MASS INDEX: 38.5 KG/M2 | RESPIRATION RATE: 18 BRPM | DIASTOLIC BLOOD PRESSURE: 99 MMHG | HEART RATE: 89 BPM | OXYGEN SATURATION: 97 % | HEIGHT: 74 IN | SYSTOLIC BLOOD PRESSURE: 170 MMHG | TEMPERATURE: 98.4 F | WEIGHT: 300 LBS

## 2019-12-28 DIAGNOSIS — L02.91 ABSCESS: Primary | ICD-10-CM

## 2019-12-28 DIAGNOSIS — J20.9 ACUTE BRONCHITIS, UNSPECIFIED ORGANISM: ICD-10-CM

## 2019-12-28 PROCEDURE — 99282 EMERGENCY DEPT VISIT SF MDM: CPT | Performed by: PHYSICIAN ASSISTANT

## 2019-12-28 PROCEDURE — 75810000289 HC I&D ABSCESS SIMP/COMP/MULT: Performed by: PHYSICIAN ASSISTANT

## 2019-12-28 RX ORDER — SULFAMETHOXAZOLE AND TRIMETHOPRIM 800; 160 MG/1; MG/1
1 TABLET ORAL 2 TIMES DAILY
Qty: 14 TAB | Refills: 0 | Status: SHIPPED | OUTPATIENT
Start: 2019-12-28

## 2019-12-28 RX ORDER — PREDNISONE 50 MG/1
50 TABLET ORAL DAILY
Qty: 3 TAB | Refills: 0 | Status: SHIPPED | OUTPATIENT
Start: 2019-12-28 | End: 2019-12-31

## 2019-12-28 RX ORDER — TRAMADOL HYDROCHLORIDE 50 MG/1
50 TABLET ORAL
Qty: 9 TAB | Refills: 0 | Status: SHIPPED | OUTPATIENT
Start: 2019-12-28 | End: 2019-12-31

## 2019-12-28 NOTE — ED PROVIDER NOTES
The history is provided by the patient. Cough   This is a new problem. The current episode started more than 2 days ago. The problem occurs constantly. The problem has been gradually improving. The cough is productive of sputum. There has been no fever. Pertinent negatives include no sore throat and no myalgias. The treatment provided mild relief. He is not a smoker. His past medical history is significant for bronchitis. Abscess    This is a new problem. The current episode started more than 2 days ago. The problem has been gradually worsening. The problem is associated with an unknown factor. There has been no fever. The rash is present on the chest. The pain is at a severity of 5/10. The pain is mild. The pain has been constant since onset. Associated symptoms include pain and weeping. Treatments tried: antibiotic ointment  The treatment provided mild relief.         Past Medical History:   Diagnosis Date    Anxiety     controlled with med    Arthritis     UA- knees    Hypertension     managed with meds    Morbid obesity ([facility] [address] 01/10/2018    BMI 40    Snores     \"never been tested for INO\"       Past Surgical History:   Procedure Laterality Date    HX ORTHOPAEDIC Right ~2008    ankle reconstruction/ arches corrected    HX ORTHOPAEDIC Left ~2010    ankle reconstruction/ arches corrected         Family History:   Problem Relation Age of Onset    Breast Cancer Mother     Other Father     No Known Problems Brother     No Known Problems Brother     No Known Problems Brother        Social History     Socioeconomic History    Marital status:      Spouse name: Not on file    Number of children: Not on file    Years of education: Not on file    Highest education level: Not on file   Occupational History    Not on file   Social Needs    Financial resource strain: Not on file    Food insecurity:     Worry: Not on file     Inability: Not on file    Transportation needs:     Medical: Not on file Non-medical: Not on file   Tobacco Use    Smoking status: Never Smoker    Smokeless tobacco: Never Used   Substance and Sexual Activity    Alcohol use: Yes     Alcohol/week: 7.0 standard drinks     Types: 7 Cans of beer per week    Drug use: No    Sexual activity: Not on file   Lifestyle    Physical activity:     Days per week: Not on file     Minutes per session: Not on file    Stress: Not on file   Relationships    Social connections:     Talks on phone: Not on file     Gets together: Not on file     Attends Tenriism service: Not on file     Active member of club or organization: Not on file     Attends meetings of clubs or organizations: Not on file     Relationship status: Not on file    Intimate partner violence:     Fear of current or ex partner: Not on file     Emotionally abused: Not on file     Physically abused: Not on file     Forced sexual activity: Not on file   Other Topics Concern    Not on file   Social History Narrative    Not on file         ALLERGIES: Patient has no known allergies. Review of Systems   HENT: Negative for sore throat. Respiratory: Positive for cough. Musculoskeletal: Negative for myalgias. All other systems reviewed and are negative. Vitals:    12/28/19 0952   BP: (!) 170/99   Pulse: 89   Resp: 18   Temp: 98.4 °F (36.9 °C)   SpO2: 97%   Weight: 136.1 kg (300 lb)   Height: 6' 1.75\" (1.873 m)            Physical Exam  Vitals signs and nursing note reviewed. Constitutional:       General: He is not in acute distress. Appearance: Normal appearance. He is well-developed. He is not diaphoretic. HENT:      Head: Normocephalic and atraumatic. Left Ear: Tympanic membrane normal.      Nose: Nose normal.   Eyes:      Pupils: Pupils are equal, round, and reactive to light. Neck:      Musculoskeletal: Normal range of motion and neck supple. Cardiovascular:      Rate and Rhythm: Normal rate and regular rhythm.    Pulmonary:      Effort: Pulmonary effort is normal.      Breath sounds: Normal breath sounds. Comments: Congested cough   Abdominal:      General: Bowel sounds are normal.      Palpations: Abdomen is soft. Musculoskeletal: Normal range of motion. Skin:     General: Skin is warm. Comments: Chest with approximately quarter sized ulcerated area with yellow eschar type dried drainage surrounding erythema slight drainage to compression   Neurological:      Mental Status: He is alert and oriented to person, place, and time. MDM  Number of Diagnoses or Management Options  Diagnosis management comments: Bronchitis will treat with septra and prednisone  Abscess to chest covered by Septra also       Amount and/or Complexity of Data Reviewed  Review and summarize past medical records: yes    Risk of Complications, Morbidity, and/or Mortality  Presenting problems: low  Diagnostic procedures: low  Management options: low    Patient Progress  Patient progress: improved         I&D Abcess Simple  Date/Time: 12/28/2019 10:28 AM  Performed by: XAVIER Avila  Authorized by: XAVIER Avila     Consent:     Consent obtained:  Verbal    Consent given by:  Patient    Risks discussed:  Incomplete drainage    Alternatives discussed:  Alternative treatment  Location:     Type:  Abscess    Size:  2.5cm     Location:  Trunk    Trunk location:  Chest  Pre-procedure details:     Skin preparation:  Betadine  Anesthesia (see MAR for exact dosages): Anesthesia method:  Local infiltration    Local anesthetic:  Lidocaine 1% w/o epi  Procedure type:     Complexity:  Simple  Procedure details:     Needle aspiration: no      Incision types:  Single straight    Incision depth:  Dermal    Scalpel blade:  11    Drainage:  Bloody    Drainage amount:  Scant    Wound treatment:  Wound left open    Packing materials:  None  Post-procedure details:     Patient tolerance of procedure:   Tolerated well, no immediate complications

## 2019-12-28 NOTE — ED NOTES
I have reviewed discharge instructions with the patient. The patient verbalized understanding. Patient left ED via Discharge Method: ambulatory to Home with self    Opportunity for questions and clarification provided. Patient given 3 scripts. To continue your aftercare when you leave the hospital, you may receive an automated call from our care team to check in on how you are doing. This is a free service and part of our promise to provide the best care and service to meet your aftercare needs.  If you have questions, or wish to unsubscribe from this service please call [phone]. Thank you for Choosing our [facility] Emergency Department.

## 2019-12-28 NOTE — DISCHARGE INSTRUCTIONS
Use meds as directed, keep area clean and covered, see your primary care physician for recheck or return to ER if symptoms worsen

## 2022-03-19 PROBLEM — M17.12 PRIMARY OSTEOARTHRITIS OF LEFT KNEE: Status: ACTIVE | Noted: 2018-01-14

## 2022-03-19 PROBLEM — M17.12 LEFT KNEE DJD: Status: ACTIVE | Noted: 2018-01-15

## 2025-02-10 ENCOUNTER — HOSPITAL ENCOUNTER (OUTPATIENT)
Dept: PHYSICAL THERAPY | Age: 65
Setting detail: RECURRING SERIES
Discharge: HOME | End: 2025-02-13
Payer: MEDICARE

## 2025-02-10 DIAGNOSIS — R26.2 DIFFICULTY WALKING: ICD-10-CM

## 2025-02-10 DIAGNOSIS — R60.0 LOCALIZED EDEMA: ICD-10-CM

## 2025-02-10 DIAGNOSIS — I89.0 LYMPHEDEMA, NOT ELSEWHERE CLASSIFIED: Primary | ICD-10-CM

## 2025-02-10 PROCEDURE — 97535 SELF CARE MNGMENT TRAINING: CPT

## 2025-02-10 PROCEDURE — 97166 OT EVAL MOD COMPLEX 45 MIN: CPT

## 2025-02-10 ASSESSMENT — PAIN SCALES - GENERAL: PAINLEVEL_OUTOF10: 3

## 2025-02-10 NOTE — PROGRESS NOTES
lymphedema .   SELF CARE: (35 minutes):   The patient was educated on lymph pathology, precautions as outlined the the [employer], complete decongestive therapy(CDT), skin integrity management, exercises and elevation.    He is not a good self bandaging candidate but needs more compression than his current compression socks so velcro wrap was recommended for daily wear until swelling has decreased and he can go back to 20-30mmHg compression socks. He was measured today and fitted for: Sigvaris Compreflex transition calf, black, XL tall and Juzo dynamic cotton for men black, V, regular length, 20-30mmHg. Using an in clinic sample, he was educated/trained on donning/doffing garment and order was placed with [facility] and will be shipped directly to his home. He will wear garment daily while continuing to elevate and exercise. Recommended he use daily moisturizer for skin care ie Eucerin lotion and apply in an upward motion. Since he is unable to come to therapy for MLD, also recommended a compression pump for home use and an order was initiated with [facility].     Treatment/Session Summary:    Treatment Assessment:   Patient is unable to come to [facility] for weekly therapy. Established a new home program today to include new compression velcro wraps/Compreflex Transition calf, skin care with Eucerin, elevation, exercise and compression pump order. He aury return to therapy in 1 month to evaluate success of home program.    Communication/Consultation:  Therapy Evaluation sent to referring provider  Equipment provided today:  garments ordered from [facility]  Recommendations/Intent for next treatment session: Next visit will focus on CDT to reduce RLE swelling with emphasis on home program/self management.    >Total Treatment Billable Duration: 50 minutes, evaluation and treatment  OT Individual Minutes  Time In: 1010  Time Out: 1100  Minutes: 50    Paty Bronson, OT     Charge Capture

## 2025-03-10 ENCOUNTER — HOSPITAL ENCOUNTER (OUTPATIENT)
Dept: PHYSICAL THERAPY | Age: 65
Setting detail: RECURRING SERIES
Discharge: HOME | End: 2025-03-13
Payer: MEDICARE

## 2025-03-10 PROCEDURE — 97535 SELF CARE MNGMENT TRAINING: CPT

## 2025-03-10 ASSESSMENT — PAIN SCALES - GENERAL: PAINLEVEL_OUTOF10: 0

## 2025-03-10 NOTE — PROGRESS NOTES
Gunnar BELTRAN Jhon  : 1960  Primary: [employer] (Medicare Managed)  Secondary:  [facility] @ [facility]  [address] [place] [address]  Phone: [phone]  Fax: [phone]    Plan of Care/Certification Expiration Date:  Certification Period Expiration Date: 25      Frequency/Duration:   Plan Frequency: 1x/month      Time In/Out:   Time In: 1100  Time Out: 1125    OT Visit Info:  Plan Frequency: 1x/month  Total # of Visits to Date: 2  Progress Note Counter: 2       Visit Count: Visit count could not be calculated. Make sure you are using a visit which is associated with an episode.   OUTPATIENT OCCUPATIONAL THERAPY: Treatment Note 3/10/2025  Episode: (LE lymphedema)         Treatment Diagnosis:    No data found  Medical/Referring Diagnosis:   Lymphedema, not elsewhere classified   Referring Physician:  Shanthi Andrade MD MD Orders:  OT Eval and Treat   Return MD Appt:  TBD   Date of Onset:      Allergies:  Patient has no allergy information on record.  Restrictions/Precautions:   None      Medications Last Reviewed:  3/10/2025     Interventions Planned (Treatment may consist of any combination of the following):     Manual therapy: manual lymph drainage, Self care/ADL: multi layer compression bandaging, compression garment fitting, skin care, home program development, patient/caregiver training/education, ADL/home management training, Therapeutic exercises: ROM, strengthening     Subjective Comments:   \"My right leg looks like my left leg now. I've wearing the velcro wrap everyday and it really works\"  >Initial Pain Level:     0/10  >Post Session Pain Level:     0/10  Medications Last Reviewed:  3/10/2025  Updated Objective Findings:   Circumferential measurements: .5cm  Treatment   MANUAL THERAPY: ( minutes):   Manual lymphatic drainage was utilized and necessary because of the patient's  RLE lymphedema .   SELF CARE: (25 minutes):

## (undated) DEVICE — SOLUTION IRRIG 3000ML 0.9% SOD CHL FLX CONT 0797208] ICU MEDICAL INC]

## (undated) DEVICE — KENDALL SCD EXPRESS SLEEVES, KNEE LENGTH, LARGE: Brand: KENDALL SCD

## (undated) DEVICE — X-LARGE COTTON GLOVE: Brand: DEROYAL

## (undated) DEVICE — INTENDED FOR TISSUE SEPARATION, AND OTHER PROCEDURES THAT REQUIRE A SHARP SURGICAL BLADE TO PUNCTURE OR CUT.: Brand: BARD-PARKER SAFETY BLADES SIZE 10, STERILE

## (undated) DEVICE — DRAPE,TOP,102X53,STERILE: Brand: MEDLINE

## (undated) DEVICE — PAD,ABDOMINAL,5"X9",STERILE,LF,1/PK: Brand: MEDLINE INDUSTRIES, INC.

## (undated) DEVICE — T4 HOOD

## (undated) DEVICE — 60 ML SYRINGE LUER-LOCK TIP: Brand: MONOJECT

## (undated) DEVICE — FAN SPRAY KIT: Brand: PULSAVAC®

## (undated) DEVICE — DRAPE TBL W72XH34IN D30IN SGL PC DISPOSABLE

## (undated) DEVICE — Device

## (undated) DEVICE — SUTURE VCRL SZ 0 L27IN ABSRB UD L36MM CP-1 1/2 CIR REV CUT J267H

## (undated) DEVICE — DRAPE,U/SHT,SPLIT,FILM,60X84,STERILE: Brand: MEDLINE

## (undated) DEVICE — MEDI-VAC YANKAUER SUCTION HANDLE W/BULBOUS TIP: Brand: CARDINAL HEALTH

## (undated) DEVICE — SLIM BODY SKIN STAPLER: Brand: APPOSE ULC

## (undated) DEVICE — REM POLYHESIVE ADULT PATIENT RETURN ELECTRODE: Brand: VALLEYLAB

## (undated) DEVICE — SUTURE VCRL SZ 2-0 L27IN ABSRB UD L36MM CP-1 1/2 CIR REV J266H

## (undated) DEVICE — 2108 SERIES SAGITTAL BLADE, GROUND (18.5 X 1.32 X 86.0MM)

## (undated) DEVICE — AMD ANTIMICROBIAL GAUZE SPONGES,12 PLY USP TYPE VII, 0.2% POLYHEXAMETHYLENE BIGUANIDE HCI (PHMB): Brand: CURITY

## (undated) DEVICE — BANDAGE COMPR SELF ADH 5 YDX6 IN TAN STRL PREMIERPRO LF

## (undated) DEVICE — SUTURE VCRL SZ 1 L27IN ABSRB UD L36MM CP-1 1/2 CIR REV CUT J268H

## (undated) DEVICE — BUTTON SWITCH PENCIL BLADE ELECTRODE, HOLSTER: Brand: EDGE

## (undated) DEVICE — 3000CC GUARDIAN II: Brand: GUARDIAN

## (undated) DEVICE — DISPOSABLE TOURNIQUET CUFF SINGLE BLADDER, DUAL PORT AND LUER LOCK CONNECTOR: Brand: COLOR CUFF

## (undated) DEVICE — NDL SPNE QNCKE 18GX3.5IN LF --

## (undated) DEVICE — SYR BULB 60ML IRRIGATION -- CONVERT TO ITEM 116413

## (undated) DEVICE — INTENDED FOR TISSUE SEPARATION, AND OTHER PROCEDURES THAT REQUIRE A SHARP SURGICAL BLADE TO PUNCTURE OR CUT.: Brand: BARD-PARKER SAFETY BLADES SIZE 15, STERILE

## (undated) DEVICE — BASIC SINGLE BASIN-LF: Brand: MEDLINE INDUSTRIES, INC.

## (undated) DEVICE — (D)PREP SKN CHLRAPRP APPL 26ML -- CONVERT TO ITEM 371833

## (undated) DEVICE — STERILE PRESSURE PROTECTOR PAD® FOR DE MAYO UNIVERSAL DISTRACTOR® (10/CASE): Brand: DE MAYO UNIVERSAL DISTRACTOR®

## (undated) DEVICE — DRESSING,GAUZE,XEROFORM,CURAD,1"X8",ST: Brand: CURAD

## (undated) DEVICE — 2000CC GUARDIAN II: Brand: GUARDIAN

## (undated) DEVICE — STERILE TETRA-FLEX CF LF, 6IN X 11 YD: Brand: TETRA-FLEX™ CF

## (undated) DEVICE — PACK PROCEDURE SURG TOT KNEE